# Patient Record
Sex: FEMALE | Race: BLACK OR AFRICAN AMERICAN | NOT HISPANIC OR LATINO | Employment: UNEMPLOYED | URBAN - METROPOLITAN AREA
[De-identification: names, ages, dates, MRNs, and addresses within clinical notes are randomized per-mention and may not be internally consistent; named-entity substitution may affect disease eponyms.]

---

## 2021-09-17 ENCOUNTER — HOSPITAL ENCOUNTER (EMERGENCY)
Facility: OTHER | Age: 51
Discharge: HOME | End: 2021-09-17
Attending: EMERGENCY MEDICINE
Payer: MEDICAID

## 2021-09-17 VITALS
HEART RATE: 88 BPM | DIASTOLIC BLOOD PRESSURE: 86 MMHG | RESPIRATION RATE: 19 BRPM | TEMPERATURE: 98 F | BODY MASS INDEX: 25.82 KG/M2 | SYSTOLIC BLOOD PRESSURE: 120 MMHG | WEIGHT: 160 LBS | OXYGEN SATURATION: 98 %

## 2021-09-17 DIAGNOSIS — Z71.85 VACCINE COUNSELING: Primary | ICD-10-CM

## 2021-09-17 PROCEDURE — 99282 EMERGENCY DEPT VISIT SF MDM: CPT | Mod: 25

## 2021-09-17 PROCEDURE — 0002A HC IMMUNIZ ADMIN, SARS-COV-2 COVID-19 VACC, 30MCG/0.3ML, 2ND DOSE: CPT | Performed by: PHYSICIAN ASSISTANT

## 2021-09-17 PROCEDURE — 63600175 PHARM REV CODE 636 W HCPCS: Performed by: PHYSICIAN ASSISTANT

## 2021-09-17 PROCEDURE — 91300 PHARM REV CODE 636 W HCPCS: CPT | Performed by: PHYSICIAN ASSISTANT

## 2021-09-17 RX ADMIN — RNA INGREDIENT BNT-162B2 0.3 ML: 0.23 INJECTION, SUSPENSION INTRAMUSCULAR at 12:09

## 2023-01-18 ENCOUNTER — HOSPITAL ENCOUNTER (EMERGENCY)
Facility: OTHER | Age: 53
Discharge: LEFT BEFORE DISCHARGE | End: 2023-01-18
Payer: MEDICAID

## 2023-01-18 VITALS
OXYGEN SATURATION: 97 % | BODY MASS INDEX: 26.52 KG/M2 | DIASTOLIC BLOOD PRESSURE: 67 MMHG | HEART RATE: 88 BPM | TEMPERATURE: 99 F | SYSTOLIC BLOOD PRESSURE: 112 MMHG | WEIGHT: 165 LBS | RESPIRATION RATE: 18 BRPM | HEIGHT: 66 IN

## 2023-01-18 DIAGNOSIS — R07.9 CHEST PAIN: ICD-10-CM

## 2023-01-18 PROCEDURE — 93010 ELECTROCARDIOGRAM REPORT: CPT | Mod: ,,, | Performed by: INTERNAL MEDICINE

## 2023-01-18 PROCEDURE — 99900041 HC LEFT WITHOUT BEING SEEN- EMERGENCY

## 2023-01-18 PROCEDURE — 93005 ELECTROCARDIOGRAM TRACING: CPT

## 2023-01-18 PROCEDURE — 93010 EKG 12-LEAD: ICD-10-PCS | Mod: ,,, | Performed by: INTERNAL MEDICINE

## 2023-01-26 ENCOUNTER — HOSPITAL ENCOUNTER (EMERGENCY)
Facility: HOSPITAL | Age: 53
Discharge: HOME | End: 2023-01-26
Attending: EMERGENCY MEDICINE
Payer: COMMERCIAL

## 2023-01-26 VITALS
OXYGEN SATURATION: 97 % | HEART RATE: 88 BPM | RESPIRATION RATE: 18 BRPM | DIASTOLIC BLOOD PRESSURE: 70 MMHG | BODY MASS INDEX: 26.63 KG/M2 | TEMPERATURE: 99 F | HEIGHT: 66 IN | SYSTOLIC BLOOD PRESSURE: 120 MMHG

## 2023-01-26 DIAGNOSIS — S80.12XA CONTUSION OF LEFT LOWER EXTREMITY, INITIAL ENCOUNTER: Primary | ICD-10-CM

## 2023-01-26 PROCEDURE — 99283 EMERGENCY DEPT VISIT LOW MDM: CPT | Mod: ,,, | Performed by: EMERGENCY MEDICINE

## 2023-01-26 PROCEDURE — 99283 PR EMERGENCY DEPT VISIT,LEVEL III: ICD-10-PCS | Mod: ,,, | Performed by: EMERGENCY MEDICINE

## 2023-01-26 PROCEDURE — 99281 EMR DPT VST MAYX REQ PHY/QHP: CPT

## 2023-01-26 RX ORDER — KETOROLAC TROMETHAMINE 10 MG/1
10 TABLET, FILM COATED ORAL
Status: DISCONTINUED | OUTPATIENT
Start: 2023-01-26 | End: 2023-01-26

## 2023-01-26 NOTE — ED TRIAGE NOTES
Pt arriving to ED with c/o left shin pain. Pt struck the anterior left shin on an arm rest while exiting a vehicle. Small hematoma noted. Pt able to ambulate without difficulty.

## 2023-01-26 NOTE — ED NOTES
Bed: [facility]  Expected date: 1/26/23  Expected time:   Means of arrival:   Comments:  Marie

## 2023-01-26 NOTE — FIRST PROVIDER EVALUATION
Emergency Department TeleTriage Encounter Note      CHIEF COMPLAINT    Chief Complaint   Patient presents with    Leg Pain     Pt struck the anterior left shin on an arm rest while exiting a vehicle. Small hematoma noted. Pt able to ambulate without difficulty.        VITAL SIGNS   Initial Vitals [01/26/23 1117]   BP Pulse Resp Temp SpO2   120/70 105 18 98.5 °F (36.9 °C) 97 %      MAP       --            ALLERGIES    Review of patient's allergies indicates:  No Known Allergies    PROVIDER TRIAGE NOTE  Patient presents with swollen area on left shin secondary to hitting it on an arm rest while exiting a bus. No bony pain or joint pain.       ORDERS  Labs Reviewed - No data to display    ED Orders (720h ago, onward)      None              Virtual Visit Note: The provider triage portion of this emergency department evaluation and documentation was performed via [employer], a HIPAA-compliant telemedicine application, in concert with a tele-presenter in the room. A face to face patient evaluation with one of my colleagues will occur once the patient is placed in an emergency department room.      DISCLAIMER: This note was prepared with M*VTM voice recognition transcription software. Garbled syntax, mangled pronouns, and other bizarre constructions may be attributed to that software system.

## 2023-01-26 NOTE — ED NOTES
Patient identifiers verified and correct for Amina Salazar   LOC: The patient is awake, alert and aware of environment with an appropriate affect, the patient is oriented x 3 and speaking appropriately.   APPEARANCE: Patient appears comfortable and in no acute distress, patient is clean and well groomed.  SKIN: The skin is warm and dry, color consistent with ethnicity, patient has normal skin turgor and moist mucus membranes.   MUSCULOSKELETAL: Patient moving all extremities spontaneously, small; bruise and knot noted to left lower leg.   RESPIRATORY: Airway is open and patent, respirations are spontaneous, patient has a normal effort and rate, no accessory muscle use noted, O2 sats noted at 97% on room air.  CARDIAC: Patient has a tachy rate, no edema noted, capillary refill < 3 seconds.   GASTRO: Soft and non tender to palpation, no distention noted, normoactive bowel sounds present in all four quadrants. Pt states bowel movements have been regular.  : Pt denies any pain or frequency with urination.  NEURO: Pt opens eyes spontaneously, behavior appropriate to situation, follows commands, facial expression symmetrical, bilateral hand grasp equal and even, purposeful motor response noted, normal sensation in all extremities when touched with a finger.

## 2023-01-26 NOTE — ED PROVIDER NOTES
Encounter Date: 2023       History     Chief Complaint   Patient presents with    Leg Pain     Pt struck the anterior left shin on an arm rest while exiting a vehicle. Small hematoma noted. Pt able to ambulate without difficulty.      HPI  53 Y/O C/O     Review of patient's allergies indicates:  No Known Allergies  History reviewed. No pertinent past medical history.  Past Surgical History:   Procedure Laterality Date     SECTION       History reviewed. No pertinent family history.  Social History     Tobacco Use    Smoking status: Every Day     Packs/day: 1.00     Types: Cigarettes    Smokeless tobacco: Never   Substance Use Topics    Alcohol use: No    Drug use: No     Review of Systems    Physical Exam     Initial Vitals [23 1117]   BP Pulse Resp Temp SpO2   120/70 105 18 98.5 °F (36.9 °C) 97 %      MAP       --         Physical Exam    ED Course   Procedures  Labs Reviewed   POCT URINE PREGNANCY          Imaging Results    None          Medications   ketorolac tablet 10 mg (has no administration in time range)     Medical Decision Making:   History:   Old Medical Records: I decided to obtain old medical records.  Initial Assessment:   Well appearing afebrile, hemodynamically stable neurovascularly intact female presents with signs and symptoms of contusion to left tibialis anterior muscle of left lower extremity.  No skin tears warranting repair.  No need for x-rays, as patient has bony tenderness to palpation no, deformity and patient has been ambulating for 3 days since to vision to site.  She reports being here in ED for accidental reporting and treatment form being filled out, which I will gladly comply.  Recommended rest and over-the-counter acetaminophen or ibuprofen as needed for pain.                        Clinical Impression:   Final diagnoses:  [S80.12XA] Contusion of left lower extremity, initial encounter - 3 days ago (Primary)        ED Disposition Condition    Discharge Stable           ED Prescriptions    None       Follow-up Information       Follow up With Specialties Details Why Contact Info    [facility] - Emergency Dept Emergency Medicine  If pain worsens, inability to ambulate/walk, any numbness, weakness or any new concerns [id number]  [facility] [id number]  [phone]             Naren Villatoro MD  01/27/23 9404

## 2023-03-10 ENCOUNTER — TELEPHONE (OUTPATIENT)
Dept: SURGERY | Facility: CLINIC | Age: 53
End: 2023-03-10
Payer: MEDICAID

## 2023-03-10 DIAGNOSIS — D05.10 DCIS (DUCTAL CARCINOMA IN SITU) OF BREAST: Primary | ICD-10-CM

## 2023-03-10 NOTE — TELEPHONE ENCOUNTER
Attempted to call pt to schedule, no answer, left voicemail for return call. Will hold off on sending for interp until pt makes appt.

## 2023-03-10 NOTE — TELEPHONE ENCOUNTER
----- Message from Michael Beard sent at 3/8/2023 11:14 AM CST -----  Leeanna Latricia,    Just checked my request and all images I requested have been put in the chart. Thank you for checking!  ----- Message -----  From: Karla Anne RN  Sent: 3/8/2023   9:26 AM CST  To: Michael Beard    Good morning Michael,  I took this one over for Corinne. I see some images, do you know if this is all of the ones you requested? Want to make sure before I schedule, thanks  Latricia  ----- Message -----  From: Corinne E Coniglio, RN  Sent: 3/7/2023  11:31 AM CST  To: Karla Anne RN      ----- Message -----  From: Michael Beard  Sent: 3/3/2023   1:03 PM CST  To: Corinne E Coniglio, RN, Thea Perrin Hey Corinne,    The [employer] insurance is connected to a Workers Comp guarantor account. The Medicaid is verified active as of today.  ----- Message -----  From: Corinne E Coniglio, RN  Sent: 3/3/2023  12:49 PM CST  To: Michael Sanabriaa,   Not sure if her medicaid is still active but do we take [employer] insurance?  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  Diamond Dominguez  02/22/23 hematology oncology [facility] care everywhere    What imaging have you had? mammogram screening bilateral 01/24/23 and mammo tomosynthesis diagnostic left 013/0/23  Where did that occur?  [facility] (last 3 years in care everywhere)    Have you been diagnosed with cancer by a biopsy and/or surgery? Yes   Date of biopsy: 02/06/23  Date of surgery:  Where did that occur?  [facility] (2/06/23 radiology encounter care everywhere)    Other pertinent info:  The patient would like a second opinion with us and would like to be scheduled before 03/09/23 (she  will see [facility] that day)  Sent request to [facility] to 6APT

## 2023-03-13 ENCOUNTER — HOSPITAL ENCOUNTER (OUTPATIENT)
Dept: RADIOLOGY | Facility: HOSPITAL | Age: 53
Discharge: HOME | End: 2023-03-13
Attending: SURGERY
Payer: MEDICAID

## 2023-03-13 PROCEDURE — 77066 DX MAMMO INCL CAD BI: CPT | Mod: 26,,, | Performed by: RADIOLOGY

## 2023-03-13 PROCEDURE — 19081 BX BREAST 1ST LESION STRTCTC: CPT | Mod: 26,LT,, | Performed by: RADIOLOGY

## 2023-03-13 PROCEDURE — 77066 PR MAMMO, CAD, DIAGNOSTIC, BILAT: ICD-10-PCS | Mod: 26,,, | Performed by: RADIOLOGY

## 2023-03-13 PROCEDURE — 19081 PR BX BRST, 1ST LESION, STEREOTACTIC GUIDANCE: ICD-10-PCS | Mod: 26,LT,, | Performed by: RADIOLOGY

## 2023-03-20 ENCOUNTER — LAB VISIT (OUTPATIENT)
Dept: LAB | Facility: HOSPITAL | Age: 53
End: 2023-03-20
Attending: SURGERY
Payer: MEDICAID

## 2023-03-20 ENCOUNTER — DOCUMENTATION ONLY (OUTPATIENT)
Dept: SURGERY | Facility: CLINIC | Age: 53
End: 2023-03-20
Payer: MEDICAID

## 2023-03-20 ENCOUNTER — OFFICE VISIT (OUTPATIENT)
Dept: SURGERY | Facility: CLINIC | Age: 53
End: 2023-03-20
Payer: MEDICAID

## 2023-03-20 VITALS
WEIGHT: 152 LBS | BODY MASS INDEX: 24.43 KG/M2 | HEIGHT: 66 IN | SYSTOLIC BLOOD PRESSURE: 128 MMHG | TEMPERATURE: 99 F | OXYGEN SATURATION: 99 % | HEART RATE: 110 BPM | DIASTOLIC BLOOD PRESSURE: 74 MMHG

## 2023-03-20 DIAGNOSIS — C50.312 MALIGNANT NEOPLASM OF LOWER-INNER QUADRANT OF LEFT FEMALE BREAST, UNSPECIFIED ESTROGEN RECEPTOR STATUS: Primary | ICD-10-CM

## 2023-03-20 DIAGNOSIS — C50.312 MALIGNANT NEOPLASM OF LOWER-INNER QUADRANT OF LEFT FEMALE BREAST, UNSPECIFIED ESTROGEN RECEPTOR STATUS: ICD-10-CM

## 2023-03-20 DIAGNOSIS — Z01.818 PRE-OP TESTING: ICD-10-CM

## 2023-03-20 DIAGNOSIS — C50.312 MALIGNANT NEOPLASM OF LOWER-INNER QUADRANT OF LEFT FEMALE BREAST: Primary | ICD-10-CM

## 2023-03-20 DIAGNOSIS — D05.12 DUCTAL CARCINOMA IN SITU (DCIS) OF LEFT BREAST: Primary | ICD-10-CM

## 2023-03-20 LAB
ALBUMIN SERPL BCP-MCNC: 3.9 G/DL (ref 3.5–5.2)
ALP SERPL-CCNC: 72 U/L (ref 55–135)
ALT SERPL W/O P-5'-P-CCNC: 18 U/L (ref 10–44)
ANION GAP SERPL CALC-SCNC: 5 MMOL/L (ref 8–16)
AST SERPL-CCNC: 20 U/L (ref 10–40)
BASOPHILS # BLD AUTO: 0.06 K/UL (ref 0–0.2)
BASOPHILS NFR BLD: 0.7 % (ref 0–1.9)
BILIRUB SERPL-MCNC: 0.3 MG/DL (ref 0.1–1)
BUN SERPL-MCNC: 10 MG/DL (ref 6–20)
CALCIUM SERPL-MCNC: 10 MG/DL (ref 8.7–10.5)
CHLORIDE SERPL-SCNC: 107 MMOL/L (ref 95–110)
CO2 SERPL-SCNC: 29 MMOL/L (ref 23–29)
CREAT SERPL-MCNC: 0.9 MG/DL (ref 0.5–1.4)
DIFFERENTIAL METHOD: ABNORMAL
EOSINOPHIL # BLD AUTO: 0.1 K/UL (ref 0–0.5)
EOSINOPHIL NFR BLD: 0.6 % (ref 0–8)
ERYTHROCYTE [DISTWIDTH] IN BLOOD BY AUTOMATED COUNT: 13.2 % (ref 11.5–14.5)
EST. GFR  (NO RACE VARIABLE): >60 ML/MIN/1.73 M^2
GLUCOSE SERPL-MCNC: 72 MG/DL (ref 70–110)
HCT VFR BLD AUTO: 43.9 % (ref 37–48.5)
HGB BLD-MCNC: 14.3 G/DL (ref 12–16)
IMM GRANULOCYTES # BLD AUTO: 0.02 K/UL (ref 0–0.04)
IMM GRANULOCYTES NFR BLD AUTO: 0.2 % (ref 0–0.5)
LYMPHOCYTES # BLD AUTO: 2.2 K/UL (ref 1–4.8)
LYMPHOCYTES NFR BLD: 27.1 % (ref 18–48)
MCH RBC QN AUTO: 32.1 PG (ref 27–31)
MCHC RBC AUTO-ENTMCNC: 32.6 G/DL (ref 32–36)
MCV RBC AUTO: 98 FL (ref 82–98)
MONOCYTES # BLD AUTO: 0.6 K/UL (ref 0.3–1)
MONOCYTES NFR BLD: 6.8 % (ref 4–15)
NEUTROPHILS # BLD AUTO: 5.3 K/UL (ref 1.8–7.7)
NEUTROPHILS NFR BLD: 64.6 % (ref 38–73)
NRBC BLD-RTO: 0 /100 WBC
PLATELET # BLD AUTO: 263 K/UL (ref 150–450)
PMV BLD AUTO: 11.9 FL (ref 9.2–12.9)
POTASSIUM SERPL-SCNC: 3.8 MMOL/L (ref 3.5–5.1)
PROT SERPL-MCNC: 7.7 G/DL (ref 6–8.4)
RBC # BLD AUTO: 4.46 M/UL (ref 4–5.4)
SODIUM SERPL-SCNC: 141 MMOL/L (ref 136–145)
WBC # BLD AUTO: 8.22 K/UL (ref 3.9–12.7)

## 2023-03-20 PROCEDURE — 99205 OFFICE O/P NEW HI 60 MIN: CPT | Mod: S$PBB,,, | Performed by: SURGERY

## 2023-03-20 PROCEDURE — 1159F MED LIST DOCD IN RCRD: CPT | Mod: CPTII,,, | Performed by: SURGERY

## 2023-03-20 PROCEDURE — 99205 PR OFFICE/OUTPT VISIT, NEW, LEVL V, 60-74 MIN: ICD-10-PCS | Mod: S$PBB,,, | Performed by: SURGERY

## 2023-03-20 PROCEDURE — 85025 COMPLETE CBC W/AUTO DIFF WBC: CPT | Performed by: SURGERY

## 2023-03-20 PROCEDURE — 99999 PR PBB SHADOW E&M-EST. PATIENT-LVL III: ICD-10-PCS | Mod: PBBFAC,,, | Performed by: SURGERY

## 2023-03-20 PROCEDURE — 36415 COLL VENOUS BLD VENIPUNCTURE: CPT | Performed by: SURGERY

## 2023-03-20 PROCEDURE — 3078F PR MOST RECENT DIASTOLIC BLOOD PRESSURE < 80 MM HG: ICD-10-PCS | Mod: CPTII,,, | Performed by: SURGERY

## 2023-03-20 PROCEDURE — 80053 COMPREHEN METABOLIC PANEL: CPT | Performed by: SURGERY

## 2023-03-20 PROCEDURE — 3008F PR BODY MASS INDEX (BMI) DOCUMENTED: ICD-10-PCS | Mod: CPTII,,, | Performed by: SURGERY

## 2023-03-20 PROCEDURE — 3074F PR MOST RECENT SYSTOLIC BLOOD PRESSURE < 130 MM HG: ICD-10-PCS | Mod: CPTII,,, | Performed by: SURGERY

## 2023-03-20 PROCEDURE — 99999 PR PBB SHADOW E&M-EST. PATIENT-LVL III: CPT | Mod: PBBFAC,,, | Performed by: SURGERY

## 2023-03-20 PROCEDURE — 1160F RVW MEDS BY RX/DR IN RCRD: CPT | Mod: CPTII,,, | Performed by: SURGERY

## 2023-03-20 PROCEDURE — 1160F PR REVIEW ALL MEDS BY PRESCRIBER/CLIN PHARMACIST DOCUMENTED: ICD-10-PCS | Mod: CPTII,,, | Performed by: SURGERY

## 2023-03-20 PROCEDURE — 3078F DIAST BP <80 MM HG: CPT | Mod: CPTII,,, | Performed by: SURGERY

## 2023-03-20 PROCEDURE — 99213 OFFICE O/P EST LOW 20 MIN: CPT | Mod: PBBFAC | Performed by: SURGERY

## 2023-03-20 PROCEDURE — 3008F BODY MASS INDEX DOCD: CPT | Mod: CPTII,,, | Performed by: SURGERY

## 2023-03-20 PROCEDURE — 3074F SYST BP LT 130 MM HG: CPT | Mod: CPTII,,, | Performed by: SURGERY

## 2023-03-20 PROCEDURE — 1159F PR MEDICATION LIST DOCUMENTED IN MEDICAL RECORD: ICD-10-PCS | Mod: CPTII,,, | Performed by: SURGERY

## 2023-03-20 NOTE — PROGRESS NOTES
Genetics Lay Navigator Note:    Nurse Navigator : TETO Olivera RN    Met with patient at her consult with Dr. Holder (3/20/2023), to facilitate genetic testing. Set patient up with [facility] genetic counselor over the phone to complete counseling prior to testing. Patient verbalized understanding to all counseling information. [facility] brochure with number to call with questions or concerns provided to patient as well as my card. Encouraged patient to call me or [facility] at any time.     Lab appointment made and patient escorted with [facility] kit to lab for specimen draw and processing. Patient instructed that results will be provided as soon as they are available. No questions or concerns from patient about plan of care.     [facility] Genetic Pedigree scanned in media and attached to this documentation note.    [employer] Tracking # [id number]

## 2023-03-20 NOTE — NURSING
Nurse Navigator Note:     Met with patient during her consult with Dr. Holder.  Patient and I reviewed the information she discussed with Dr. Holder, including treatment options, diagnosis, and future plans for workup. Patient and I went through the new patient booklet, explained some of the information and why it is provided.     Also offered patient consults with our other specialty clinics: Integrative Oncology, Survivorship and/or Women's Gynecologic needs, our breast physical therapy department for pre-op and post-operative assessments, Oncologic Psychology for psychological support, and Oncologic Nutrition for nutritional counseling. Explained to patient that all of these support services are completely optional. Discussed that physical therapy may call patient to offer pre-op appt, and what that appt would entail.     Patient was given a copy of her appointments, Dr. Holder's card, and my card. Encouraged her to call me if she has any questions or concerns or would like to schedule any additional appointments. Verbalized understanding of all information.    Genetics done at visit. PT and integrative referrals placed. Patient declined referral to smoking cessation. E mail added to support group list.  Oncology Navigation   Intake  Date of Diagnosis: 02/06/23  Cancer Type: Breast  Internal / External Referral: External  Initial Nurse Navigator Contact: 03/20/23  Date Worked: 03/20/23     Treatment  Current Status: Staging work-up    Surgical Oncologist: Dr. Holder  Consult Date: 03/20/23          Procedures: Biopsy; MRI  Biopsy Schedule Date: 02/06/23  MRI Schedule Date: 03/29/23             Support Systems: Children     Acuity      Follow Up  Follow up in about 11 days (around 3/31/2023) for f/u after MRI.

## 2023-03-21 RX ORDER — LORAZEPAM 1 MG/1
1 TABLET ORAL SEE ADMIN INSTRUCTIONS
Qty: 1 TABLET | Refills: 0 | Status: SHIPPED | OUTPATIENT
Start: 2023-03-21 | End: 2023-10-02

## 2023-03-22 NOTE — PROGRESS NOTES
Breast Surgery  [facility]  Department of Surgery      REFERRING PROVIDER: Diamond Dominguez MD  [address] [place] [id number]    Chief Complaint: Consult (New Patient 2nd Opinion Left Breast Ductal Carcinoma .)      Subjective:      Patient ID: Amina Salazar is a 52 y.o. female who presents with left breast Ductal Carcinoma in Situ.     She presented for screening mammogram on 01/24/2023 for yearly scheduled screening. This identified calcifications in the left breast. Follow-up mammogram on 01/30/2023 showed an area of amorphous calcifications in the lower inner quadrant. A stereotactic biopsy was performed on 02/06/2023 with pathology revealing ductal carcinoma in-situ of the breast.     The patient's initial imaging was performed at outside institution.  Our radiologist reviewed her imaging and found the area of calcifications to be slightly larger.  We measured approximately 5 x 4.9 cm of calcifications in the lower inner quadrant.    Pathology from an outside institution showed high-grade DCIS with mask with concern for possible invasion.  Hormone receptor testing was not able to be performed.    She was seen for evaluation for surgery and reconstruction at the outside I facility.  She was recommended quit smoking but has not been able to.  She reports that she does not believe that she has breast cancer and is inclined to proceed with no interventions.    Findings at that time were the following:   Lesion 1:    Location:  Left, lower inner quadrant   Clip:  No post biopsy mammogram available for review  Tumor size:  5 cm   Tumor ndgndrndanddndend:nd nd2nd Estrogen Receptor:  Unknown   Progesterone Receptor:  Unknown     Patient has not noted a change on breast exam.  Patient denies nipple discharge. Patient denies previous breast biopsy. Patient denies personal history of breast cancer. Family history includes mother with breast cancer at 50, cousin with breast cancer at 50, maternal uncle bone cancer at 55..  "    GYN History:  Age of menarche was 13.  Possibly perimenopausal only recently stopped birth control shots.  Patient denies hormonal therapy. Patient is . Age of first live birth was 15. Patient did not breast feed.    History reviewed. No pertinent past medical history.  Past Surgical History:   Procedure Laterality Date     SECTION       Current Outpatient Medications on File Prior to Visit   Medication Sig Dispense Refill    multivitamin with minerals tablet Take 1 tablet by mouth once daily.       No current facility-administered medications on file prior to visit.     Social History     Socioeconomic History    Marital status: Single   Tobacco Use    Smoking status: Every Day     Packs/day: 1.00     Types: Cigarettes    Smokeless tobacco: Never   Substance and Sexual Activity    Alcohol use: No    Drug use: No     History reviewed. No pertinent family history.     Review of Systems   All other systems reviewed and are negative.  Objective:   /74 (BP Location: Right arm, Patient Position: Sitting, BP Method: Medium (Automatic))   Pulse 110   Temp 98.6 °F (37 °C) (Oral)   Ht 5' 6" (1.676 m)   Wt 68.9 kg (152 lb)   SpO2 99%   BMI 24.53 kg/m²     Physical Exam   Vitals reviewed.  Constitutional: She is oriented to person, place, and time.   Cardiovascular:  Normal rate.      Exam reveals no gallop.       No murmur heard.  Pulmonary/Chest: Effort normal. No respiratory distress. She has no wheezes. Right breast exhibits no inverted nipple, no mass, no nipple discharge, no skin change and no tenderness. Left breast exhibits no inverted nipple, no mass, no nipple discharge, no skin change and no tenderness.   Abdominal: Normal appearance.   Musculoskeletal: Lymphadenopathy:      Cervical: No cervical adenopathy.      Upper Body:      Right upper body: No supraclavicular or axillary adenopathy.      Left upper body: No supraclavicular or axillary adenopathy.     Neurological: She is alert and " oriented to person, place, and time.   Skin: Skin is warm and dry.     Psychiatric: Her behavior is normal. Mood, judgment and thought content normal.     Radiology review: Images personally reviewed by me in the clinic and shown to the patient during the consultation.     Assessment:       1. Ductal carcinoma in situ (DCIS) of left breast    2. Pre-op testing          Plan:   Multidisciplinary nature of breast cancer care was discussed in detail at today's visit.    The patient reports she does not believe her cancer diagnosis and is inclined to pursue no interventions.  We discussed the possible benefit of the 2nd site biopsy as there are extensive calcifications and this would help us confirm the extent of disease.  We could also obtain more information about her hormone receptor status.  It may help her believe in the cancer diagnosis further as well.  Since there are multiple benefits risks 2nd site biopsy I recommend she proceed with it.  However I am also recommend a breast MRI to further evaluate the extent of disease.  We will get the MRI 1st and then determine the best site of the 2nd site biopsy.  Once the MRI and biopsy results are available we will discuss this further.  I am hopeful at that time the patient will feel confident in her breast cancer diagnosis.    We discussed that surgical options would include a lumpectomy versus a mastectomy.  We discussed there is no survival benefit to undergoing a mastectomy compared to lumpectomy.  We discussed that our review of the area of calcifications shows a 5 x 5 cm regional distribution.  We discussed the 2nd site biopsy to confirm the extent of calcifications.  If this represents a true extent of calcifications she would need a mastectomy as this is a large portion of her breast.  If a smaller area is identified she could potentially be a lumpectomy candidate.  We discussed both of these surgery options in detail including the risks and benefits.  We  discussed the options for breast reconstruction the setting of mastectomy.  We discussed that plastic surgeons usually do not offer reconstruction if the patient is a current nicotine user.  The patient is currently smoking and has failed attempts to quit and is not interested in quitting at this time.    We discussed sentinel lymph node biopsy in full detail including the risks and benefits.  We discussed that if a lumpectomy is done for DCIS then sentinel lymph node biopsy is not always done however when there was a large extent of breast removed then it is performed.  Due to the large extent of her calcifications she will likely need a sentinel lymph node biopsy even if a large lumpectomy is done.    We discussed the role of adjuvant radiation therapy in breast conservation.  We discussed this is not typically done with mastectomy unless breast cancer is found to be a large invasive cancer there is positive lymph nodes.      We also discussed the role of systemic therapy in the treatment of DCIS. Chemotherapy is not utilizing the treatment of DCIS.  We discussed that in the setting of hormone receptor positive DCIS, endocrine therapy would be recommended to decrease her risk of recurrence.  Side effects of treatment were briefly discussed.  We discussed that her hormone receptor status is unknown at this time.  We are hopeful that we will obtain this information from the biopsy.  She was interested in hearing about options for treatment that did not include surgery.  We discussed that some patients with hormone receptor positive breast cancers take endocrine therapy without other treatments however this has not occurred of approach.  This approach is usually limited the patient's with limited lifespan due to advanced age or severe comorbidities.  I would not recommend this approach for most patients including Ms. Salazar.     She does meet NCCN guidelines for genetic testing based on her family history. We  discussed genetic testing at length and she will like to proceed.    Following her discussion today, she does not currently believe that she has breast cancer but is unwilling to undergo additional workup.  I am recommending a breast MRI to fully evaluate the extent of disease and to identify the best target for a 2nd site biopsy.  A 2nd site biopsy will help us determine the full extent of the calcifications and will gain additional information about the DCIS including endocrine status.  We discussed extensively that all treatments for breast cancer that are considered curative include some type of surgery.  She expressed understanding.    Surgery will be scheduled. Follow-up in clinic roughly 14 days after surgery.      Patient was given patient information binder including [facility] breast cancer treatment brochure.  All her questions were answered.    Total time spent with the patient: 60 minutes.  55 minutes of face to face consultation and 5 minutes of chart review and coordination of care.

## 2023-03-29 ENCOUNTER — HOSPITAL ENCOUNTER (OUTPATIENT)
Dept: RADIOLOGY | Facility: OTHER | Age: 53
Discharge: HOME | End: 2023-03-29
Attending: SURGERY
Payer: MEDICAID

## 2023-03-29 DIAGNOSIS — C50.312 MALIGNANT NEOPLASM OF LOWER-INNER QUADRANT OF LEFT FEMALE BREAST: ICD-10-CM

## 2023-03-29 PROCEDURE — 77049 MRI BREAST C-+ W/CAD BI: CPT | Mod: 26,,, | Performed by: RADIOLOGY

## 2023-03-29 PROCEDURE — 77049 MRI BREAST C-+ W/CAD BI: CPT | Mod: TC

## 2023-03-29 PROCEDURE — A9577 INJ MULTIHANCE: HCPCS | Performed by: SURGERY

## 2023-03-29 PROCEDURE — 77049 MRI BREAST W/WO CONTRAST, W/CAD, BILATERAL: ICD-10-PCS | Mod: 26,,, | Performed by: RADIOLOGY

## 2023-03-29 PROCEDURE — 25500020 PHARM REV CODE 255: Performed by: SURGERY

## 2023-03-29 RX ADMIN — GADOBENATE DIMEGLUMINE 12 ML: 529 INJECTION, SOLUTION INTRAVENOUS at 06:03

## 2023-03-30 ENCOUNTER — CLINICAL SUPPORT (OUTPATIENT)
Dept: REHABILITATION | Facility: HOSPITAL | Age: 53
End: 2023-03-30
Attending: SURGERY
Payer: MEDICAID

## 2023-03-30 DIAGNOSIS — M25.612 DECREASED RANGE OF MOTION OF LEFT SHOULDER: ICD-10-CM

## 2023-03-30 DIAGNOSIS — C50.312 MALIGNANT NEOPLASM OF LOWER-INNER QUADRANT OF LEFT FEMALE BREAST, UNSPECIFIED ESTROGEN RECEPTOR STATUS: ICD-10-CM

## 2023-03-30 PROCEDURE — 97162 PT EVAL MOD COMPLEX 30 MIN: CPT

## 2023-03-30 NOTE — PLAN OF CARE
OUTPATIENT PHYSICAL THERAPY   PRE-OP EVALUATION    Date: 3/30/2023   Name: Amina Salazar  Clinic Number: [id number]    Therapy Diagnosis:   Encounter Diagnoses   Name Primary?    Malignant neoplasm of lower-inner quadrant of left female breast, unspecified estrogen receptor status     Decreased range of motion of left shoulder      Physician: JIN Holder MD    Physician Orders: PT Eval and Treat   Medical Diagnosis: C50.312 (ICD-10-CM) - Malignant neoplasm of lower-inner quadrant of left female breast, unspecified estrogen receptor status  Evaluation Date: 3/30/2023  Authorization period Expiration: 3/19/2024  Plan of Care Certification Period: 3/30/2023  Insurance: MEDICAID/[employer] [facility]     Visit #: 1/ Visits authorized: 1  Time In:10:00 am  Time Out: 11:00 am  Total Billable Time: 60 minutes    Precautions: Standard and cancer    History   History of Present Illness: Amina is a 52 y.o. female that presents to  [facility] at the [facility] secondary to dx of left breast cancer.    Dx: left breast Ductal Carcinoma in Situ.   Surgery date: TBD      Pt presents today for baseline measurements to aid in the early detection of lymphedema, UE muscle testing, postural and ROM assessment along with education of risk of lymphedema and surgical precautions post surgery. Circumferential measurements will be taken today of BL UEs for early detection of lymphedema post surgery. Pt will also be instructed in exercises to perform pre and post-surgery to insure best outcomes.     Past Medical History:   No past medical history on file.    Past Surgical History:   Amina Salazar  has a past surgical history that includes  section.    Medications:  Amina has a current medication list which includes the following prescription(s): lorazepam and multivitamin with minerals.    Allergies:  Review of patient's allergies indicates:  No Known Allergies       Hand  PT DAILY TREATMENT NOTE 10-18    Patient Name: Radha Pisano  Date:2020  : 1976  [x]  Patient  Verified  Payor:  / Plan: Eduin Aguirre 73 / Product Type:  /    In time:1115  Out time:1150  Total Treatment Time (min): 35  Visit #: 3 of 9    Treatment Area: Pain in left shoulder [M25.512]  Cervicalgia [M54.2]  Low back pain [M54.5]  Numbness and tingling of right arm [R20.0, R20.2]    SUBJECTIVE  Pain Level (0-10 scale): 3  Any medication changes, allergies to medications, adverse drug reactions, diagnosis change, or new procedure performed?: [x] No    [] Yes (see summary sheet for update)  Subjective functional status/changes:   [] No changes reported  \"I'm sore but better than I was. \"    OBJECTIVE    23Modality rationale: PD   Min Type Additional Details      []? Estim:  []? Unatt       []? IFC  []? Premod                        []?Other:  []?w/ice   []?w/heat  Position:  Location:      []? Estim: []? Att    []? TENS instruct  []? NMES                    []?Other:  []?w/US   []?w/ice   []?w/heat  Position:  Location:      []? Traction: []? Cervical       []? Lumbar                       []? Prone          []? Supine                       []?Intermittent   []? Continuous Lbs:  []? before manual  []? after manual      []? Ultrasound: []? Continuous   []? Pulsed                           []? 1MHz   []? 3MHz W/cm2:  Location:      []? Iontophoresis with dexamethasone         Location: []? Take home patch   []? In clinic      []? Ice     []?  heat  []? Ice massage  []? Laser   []? Anodyne Position:  Location:      []? Laser with stim  []? Other:  Position:  Location:      []? Vasopneumatic Device Pressure:       []? lo []? med []? hi   Temperature: []? lo []? med []? hi    []? Skin assessment post-treatment:  []?intact []? redness- no adverse reaction    []? redness  adverse reaction:         10 min Therapeutic Exercise:  [x]? ? See flow sheet :   Rationale: increase ROM and increase "dominance: right  Prior Therapy: L shoulder  Nutrition:  Normal  Social History: lives alone  Place of Residence (Steps/Adaptations): 1st floor apartment, 8 steps to enter, handrails on both sides  Current functional status:  independent  Exercise routine prior to onset : doing arm exercises  DME owned: none  Work:  security work                         Subjective   Pt states: left shoulder, "I may have overworked it"  Pain: 3/10 on VAS.     Objective   Mental status: alert & oriented x 3    Posture/Alignment   Postural examination/scapula alignment: rounded shoulders  Joint integrity: WFLs  Skin integrity: intact  Edema: none noted    Sensation: Light Touch: Intact           Proprioception: Intact  - appearance: well groomed     ROM:   UPPER EXTREMITY--AROM/PROM  (R) UE: WNLs  (L) UE: WNLs     Shoulder Range of Motion:   ACTIVE ROM LEFT RIGHT   Flexion 160 170   Abduction 180 180   Extension 70 72   IR/90deg 60 80   ER/90deg 65 95     Strength: manual muscle test grades below   Upper Extremity Strength   (L) UE (R) UE   Shoulder flexion: 5/5 5/5   Shoulder Abduction: 5/5 5/5   Shoulder IR 5/5 5/5   Shoulder ER 4+/5 4+/5   Elbow flexion: 5/5 5/5   Elbow extension: 5/5 5/5   Wrist flexion: 5/5 5/5   Wrist extension: 5/5 5/5    good good       Baseline Measurements of BL UE's for early detection of Lymphedema:     LANDMARK RIGHT UE LEFT UE DIFFERENCE   E + 8" 32.5 cm 30.5 cm 2 cm   E + 6" 30.5 cm 29.5 cm 1 cm   E + 4" 28 cm 26 cm 2 cm   E + 2" 26 cm 23.5 cm 2.5 cm   Elbow 24 cm 23.5 cm 0.5 cm   W+ 8" 24 cm 23 cm 1 cm   W +  6" 24 cm 21.5 cm 2.5 cm   W + 4" 21 cm 18 cm 3 cm   Wrist 15.7 cm 15 cm 0.7 cm   DPC 20 cm 19.5 cm 0.5 cm   IP Thumb 6.3 cm 6 cm 0.3 cm       Endurance Assessment:   Evaluation   30 second Chair Rise  (adults > 61 y/o) 11 completed with no arms       Coordination:   - fine motor: WFL  - UE coordination: intact     - LE coordination:  Not tested     Functional Mobility (Bed mobility, " strength to improve the patients ability to perform ADLs     15 min Neuromuscular Re-education:  [x]? ?  See flow sheet :    Rationale: increase strength, improve coordination and increase proprioception  to improve the patients ability to tolerate sustained postures    10 min Therapeutic Activity:  [x]  See flow sheet :squatting, reaching activities. Rationale: increase ROM, increase strength, improve coordination, improve balance and increase proprioception  to improve the patients ability to perform ADLs with less pain. With   [] TE   [] TA   [] neuro   [] other: Patient Education: [x] Review HEP    [] Progressed/Changed HEP based on:   [] positioning   [] body mechanics   [] transfers   [] heat/ice application    [] other:      Other Objective/Functional Measures:      Pain Level (0-10 scale) post treatment: 2.75    ASSESSMENT/Changes in Function: improved form with squatting and reaching activities today. Plan to progress core and scapular challenge next session as long as pain does not worsen. Patient will continue to benefit from skilled PT services to modify and progress therapeutic interventions, address functional mobility deficits, address ROM deficits, address strength deficits, analyze and address soft tissue restrictions, analyze and cue movement patterns, analyze and modify body mechanics/ergonomics, assess and modify postural abnormalities, address imbalance/dizziness and instruct in home and community integration to attain remaining goals.      []  See Plan of Care  []  See progress note/recertification  []  See Discharge Summary         Progress towards goals / Updated goals:  Short Term Goals: To be accomplished in 2 weeks:  1.  Pt will report compliance with HEP in order to supplement PT treatment              Eval = established              met  2.  Pt will demonstrate left shoulder IR to T10 in order to improve ability to don undergarments              Eval = PSIS      Long Term Goals: To be accomplished in 9 treatments:  1.   Pt will score at least 47 on FOTO in order to improve overall function, decrease pain, and facilitate return to OF.             Eval = 28  2.   Pt will demonstrate bilateral hip extension strength 5-/5 in order to increased ambulation distances in the community              Eval = bilaterally 4/5  3.   Pt will demonstrate left shoulder strength at least 5-/5 in order to improve ability to lift objects overhead at home              Eval = 4/5 into all planes  4.   Pt will demonstrate right  strength at least 43lbs in order to decrease frequency of dropping dishes at home              Eval = 35lbs.      PLAN  []  Upgrade activities as tolerated     [x]  Continue plan of care  []  Update interventions per flow sheet       []  Discharge due to:_  []  Other:_      Jayde Davis, PT 7/20/2020  11:23 AM    Future Appointments   Date Time Provider Trisha Bundy   7/23/2020 11:00 AM Kiran Avendano, PT [facility]   7/27/2020 11:00 AM Kiran Avendano, PT [facility]   7/30/2020 11:00 AM Kiran Avendano, PT [facility]   8/3/2020 11:00 AM Jw Flynn, PT [facility]   8/6/2020 11:00 AM Kiran Avendano, PT [facility]   8/7/2020  2:00 PM Ayse Yañez MD [facility]   8/10/2020  9:30 AM Elier Powell  [address]   8/10/2020 11:00 AM Jw Flynn, PT [facility]   8/11/2020  4:30 PM [facility] YING RM 4 3D [facility] transfers)  Bed mobility: I =  independent   Roll to left: I  Roll to right: I  Supine to prone: I  Scooting to edge of bed: I  Supine to sit: I  Sit to supine: I  Transfers to bed: I  Transfers to toilet: I  Sit to stand:  I  Stand pivot:  I  Car transfers: I      ADL's:  Feeding: I = independent   Grooming: I  Hygiene: I  UB Dressing: I  LB Dressing: I  Toileting: I  Bathing: I    Gait Assessment:   - AD used: none  - Assistance: independent  - Distance: community distances         Pt has no cultural, educational or language barriers to learning provided.    Treatment and Patient Education     Total Time Separate from Evaluation: 25 minutes    Patient participated in self care/home management for 10 minutes including the following:      - Role of PT in multi - disciplinary team, goals for PT  - Pt was educated in lymphedema etiology and management plans.    - Pt was provided with written risk reductions and precautions for managing lymphedema.   - Reviewed SHAY drain care instructions.   - use of a cervical roll to improve cervical alignment when sleeping on her sides    ROM/lifting Precautions post surgery discussed -  until drains have been removed, if needed:  - do not lift affected arm above 90 degrees of shoulder flexion  - do not lift over 5 lbs  - do not pull or push heavy objects  - do not sleep on your stomach or surgery side     Pt was instructed in and performed therapeutic exercise for 15 minutes for postural correction and alignment, stretching and soft tissue mobility.   Exercises included:   - exaggerated deep breathing and relaxation  - scapular retractions  - fist making  - elbow flexion/extension  - supine wand ER at 90 degrees abd  - scapula lift off    Pt was able to demonstrate and report understanding and performance    Written Home Exercises Provided: yes.  Exercises were reviewed and Amina was able to demonstrate them prior to the end of the session.  Amina demonstrated good   understanding of the education provided.     See EMR under Patient Instructions for exercises provided 3/30/2023.      Assessment   This is a 52 y.o. female referred to outpatient physical therapy and presents with a medical diagnosis of left breast cancer and was seen today pre-operatively to assess strength and ROM of BL UEs, to take baseline circumferential measurements of BL UEs to aid in the early detection of lymphedema and provide pt education on exercises/precations post breast surgery. Pt does not exhibit any ROM impairments  Pt educated in lymphedema risks/precautions as well as ROM/lifting precautions post surgery - pt demonstrated/verbalized understanding. No goals established this visit as goals for PT will be established post surgery at follow up.      Anticipated barriers to physical therapy: none anticipated     Pt's spiritual, cultural and educational needs considered and pt agreeable to plan of care and goals as stated below:     Medical necessity is demonstrated by the following IMPAIRMENTS/PROMBLEM LIST:  History  Co-morbidities and personal factors that may impact the plan of care Co-morbidities:   history of cancer and level of undertstanding of current condition    Personal Factors:   no deficits     moderate   Examination  Body Structures and Functions, activity limitations and participation restrictions that may impact the plan of care Body Regions:   N/A    Body Systems:    N/A    Participation Restrictions:   None anticipated    Activity limitations:   Learning and applying knowledge  no deficits    General Tasks and Commands  no deficits    Communication  no deficits    Mobility  no deficits    Self care  no deficits    Domestic Life  no deficits    Interactions/Relationships  no deficits    Life Areas  no deficits    Community and Social Life  no deficits         low   Clinical Presentation evolving clinical presentation with changing clinical characteristics low   Decision Making/  Complexity Score: low           Plan   Schedule patient for follow up with Physical therapy post surgery. Goals for therapy post surgery will be established at that time.     Therapist: Lorrie Wang, PT  3/30/2023

## 2023-03-30 NOTE — PATIENT INSTRUCTIONS
PRE OP PATIENT EDUCATION    Post Operative Instructions     Range of Motion/lifting Precautions post surgery  The following activities should be avoided until your drain(s) have been removed  - do not lift affected arm above 90 degrees of shoulder flexion  - do not lift over 5 lbs  - do not pull or push heavy objects  - do not sleep on your stomach or surgery side       After surgery, you may begin self-care tasks including grooming, dressing, feeding and simple hygiene as soon as you feel up to it.    Schedule your post-op therapy follow-up after your drains have been removed or after your first post-surgery doctor visit.    When to call your doctor   - if any part of your affected arm or axilla feels hot, is reddened or has increased swelling   - if you develop a temperature over 101 degrees Fahrenheit      Lymphedema - Identification and Prevention     Lymphedema - is the swelling of a body area or extremity caused by the accumulation of lymphatic fluid.  There is a risk for lymphedema with the removal of lymph nodes, trauma or radiation therapy.  Treatment of breast cancer often involves surgery: mastectomy or lumpectomy. Some of the lymph nodes in the underarm (called axillary lymph nodes) may be removed and checked to see if they contain cancer cells.     During breast surgery when axillary lymph nodes are removed (with sentinel node biopsy or axillary dissection) or are treated with radiation therapy, the lymphatic system may become impaired. This may prevent lymphatic fluid from leaving the area therefore, causing lymphedema.     Lymphatic fluid is a normal part of the circulatory system. Its function is to remove waste products and to produce cells vital to fighting infection. Swelling occurs when the vessels become restricted and the lymphatic fluid is unable to freely flow through them.  If lymphedema is left untreated, the affected limb could progressively become  more swollen, which could lead to hardening of the skin, bulkiness in the limb, infection and impaired wound healing.         There are things you can do to decrease the chance of developing lymphedema.                                          [email or web address]                                                                      The information presented is intended for general information and educational purposes. It is not intended to replace the  advice of your health care provider. Contact your health care provider if you believe you have a health problem.                                                    POST OP EXERCISES - SAFE TO DO THE FIRST 2 WEEKS AFTER SURGERY UNTIL YOUR FOLLOW UP APPOINTMENT WITH PHYSICAL/OCCUPATIONAL THERAPY    Scapular Retraction (Standing)    With arms at sides, squeeze shoulder blades together. Do not shrug shoulders and do not hold your breath. Hold 5 seconds. Repeat 10 times 1sessions 1-2 x day.       Exaggerated Breathing and Relaxation      Practice deep breathing frequently in the first few days following surgery even before you begin exercising. This exercise helps with tissue extensibility in the chest wall.  Inhale slowly and deeply through the nose and exhale through pursed lips. Concentrate on relaxing as you let the air out of your lungs. Repeat three (3) to four (4) times, remembering to breath in deeply and then relaxing. This exercise helps to ease the sensation of pulling and discomfort that may be experienced while exercising.      Ball Squeeze OR Hand pumps       Perform this exercise three (3) times a day for 10 reps.    The ball squeeze or hand pumps helps to prevent or reduce temporary swelling that may occur in the affected arm. This exercise may be performed standing, sitting or while lying in bed. During this exercise the affected arm should be slightly bent and held upward. Support your arm with a pillow if you are uncomfortable holding it  "up.      AROM: Elbow Flexion / Extension        With left hand palm up, gently bend elbow as far as possible. Then straighten arm as far as possible. Do this in standing.   Repeat 10 times per set. Do 1 sets per session. Do 1-3 sessions per day.      Radiation Position    Place left arm, elbow bent, beside head on pillow. Use 1-3 pillows to be comfortable.  REST AND RELAX.  Do 1-2 times per day.            Shoulder External Rotation 90 Degrees Bilateral     Lay on your back and grab the wand with both hand with palms facing forward. Raise both elbows to shoulder height and adjust hand to maintain a 90 degree elbow bend. Next, without sliding the elbow, reach hand down and back towards table or surface.        Cervical Towel for Sleeping Posture    Place a rolled up towel inside of pillowcase to maintain neck support at night.    Use a larger roll if side sleeping.        Scapular W-> Y Lift at Wall    Stand facing a wall with elbows bent and hands at shoulder height similar to a "W". Then raise arms up sliding them along the wall to form a "Y" as shown. Then lift one hand off of wall, recruiting lower traps and scapular muscles as shown. Keep abdominals engaged and do not back. Return hands to wall and repeat.   Do 10 reps per arm. Do 1 session per day.  "

## 2023-04-03 ENCOUNTER — TELEPHONE (OUTPATIENT)
Dept: SURGERY | Facility: CLINIC | Age: 53
End: 2023-04-03
Payer: MEDICAID

## 2023-04-03 NOTE — TELEPHONE ENCOUNTER
Called and discussed MRI results showing the area of increased blood flow corresponds to area of calcifications.  No other concerning areas in either breast.    Once again discussed the diagnosis of ductal carcinoma in-situ.  We discussed that standard for care includes surgery for removal of the area seen on the mammogram/MRI.  She is not interested in surgical intervention at this time.  She would consider medical interventions.  Unfortunately her prior biopsy was unable to have the estrogen receptor process.  We do not know she will benefit from endocrine therapy.  She also would benefit from a 2nd site of biopsy of the calcifications to further evaluate the extent of disease.  We discussed possible 2nd site biopsy for further receptor testing and classification of the extent of disease.  She is open to this biopsy.  I will have Radiology reach out to her to get the biopsy performed.    She reports trying to utilize holistic approaches and nutrition to treat her breast cancer diagnosis.  We discussed that there are no known curative treatments for breast cancer that do not include surgery.  We did discuss the nature of DCIS and its natural history.  However her prior biopsy did have concern for possible invasive carcinoma and was high-grade.  This type of DCIS has a higher risk of turning to invasive cancer.  She expressed understanding of these risks.  We will re-evaluate her biopsy results from the 2nd site.  If this shows invasive cancer she may be more willing to undergo surgery.    She expressed understanding that surgical interventions are considered the standard of care for grade 3 DCIS with possible invasion.  But is unwilling to proceed with surgery at this time.  She may consider surgery in the future.  She would consider medical interventions such as endocrine therapy.    Plan:  -Second site biopsy to evaluate extent of disease, evaluate for possible invasion, obtain estrogen receptor testing for  possible endocrine therapy  -Referral to nutrition as patient is losing weight utilizing her current restrictive diet which she is attempting to use as breast cancer treatment.  She is open to discussing this further with nutrition.  - referral to Medical Oncology pending 2nd site biopsy results  - presentation of her case at multidisciplinary tumor board   - if she does not proceed with surgical interventions I would at least recommend close interval follow-up with a diagnostic mammogram in June and clinical breast exam.  If any significant changes on the imaging patient may be more willing to proceed with surgery at that time.

## 2023-04-04 ENCOUNTER — TELEPHONE (OUTPATIENT)
Dept: RADIOLOGY | Facility: HOSPITAL | Age: 53
End: 2023-04-04
Payer: MEDICAID

## 2023-04-04 ENCOUNTER — DOCUMENTATION ONLY (OUTPATIENT)
Dept: SURGERY | Facility: CLINIC | Age: 53
End: 2023-04-04
Payer: MEDICAID

## 2023-04-04 DIAGNOSIS — C50.312 MALIGNANT NEOPLASM OF LOWER-INNER QUADRANT OF LEFT FEMALE BREAST, UNSPECIFIED ESTROGEN RECEPTOR STATUS: Primary | ICD-10-CM

## 2023-04-04 NOTE — NURSING
Called patient and scheduled appt with nutrition and medical oncology on 4/11/2023. Reviewed location of appts. Patient verbalized understanding.  Oncology Navigation   Intake  Date of Diagnosis: 02/06/23  Cancer Type: Breast  Internal / External Referral: External  Initial Nurse Navigator Contact: 03/20/23  Date Worked: 04/04/23     Treatment  Current Status: Active    Surgical Oncologist: Dr. Holder  Consult Date: 03/20/23    Medical Oncologist: Dr. Schumacher  Consult Date: 04/11/23       Procedures: Biopsy; MRI  Biopsy Schedule Date: 02/06/23  MRI Schedule Date: 03/29/23    Dietitian Name: SUNG Vaughn Referral Date: 04/04/23          Support Systems: Children     Acuity      Follow Up  Follow up in about 8 days (around 4/12/2023) for f/u after Med onc.

## 2023-04-04 NOTE — TELEPHONE ENCOUNTER
Spoke with patient. Reviewed breast biopsy procedure and reviewed instructions for breast biopsy. Patient expressed understanding and all questions were answered. Provided patient with my phone number to call for any further concerns or questions.   Patient scheduled breast biopsy at the [facility] on 4/20/2023.

## 2023-04-05 ENCOUNTER — TELEPHONE (OUTPATIENT)
Dept: SURGERY | Facility: CLINIC | Age: 53
End: 2023-04-05
Payer: MEDICAID

## 2023-04-05 NOTE — TELEPHONE ENCOUNTER
Genetic Lay Navigation Note:    Called patient with the results of her genetic testing. Informed patient that results were negative for any notable mutation. Instructed patient that we would ensure she received a copy of her results via [employer] or mail, and to call us with any questions or concerns regarding the full report. Patient verbalized understanding to all information, no questions at this time.     Patient's ordering physician made aware of results and that patient was informed of them.

## 2023-04-06 LAB — INTEGRATED BRAC ANALYSIS: NORMAL

## 2023-04-11 ENCOUNTER — OFFICE VISIT (OUTPATIENT)
Dept: HEMATOLOGY/ONCOLOGY | Facility: CLINIC | Age: 53
End: 2023-04-11
Payer: MEDICAID

## 2023-04-11 ENCOUNTER — TUMOR BOARD CONFERENCE (OUTPATIENT)
Dept: SURGERY | Facility: CLINIC | Age: 53
End: 2023-04-11
Payer: MEDICAID

## 2023-04-11 VITALS
HEIGHT: 66 IN | OXYGEN SATURATION: 98 % | WEIGHT: 151.38 LBS | DIASTOLIC BLOOD PRESSURE: 73 MMHG | TEMPERATURE: 98 F | SYSTOLIC BLOOD PRESSURE: 121 MMHG | BODY MASS INDEX: 24.33 KG/M2 | HEART RATE: 78 BPM | RESPIRATION RATE: 18 BRPM

## 2023-04-11 DIAGNOSIS — D05.12 DUCTAL CARCINOMA IN SITU (DCIS) OF LEFT BREAST: ICD-10-CM

## 2023-04-11 DIAGNOSIS — C50.312 MALIGNANT NEOPLASM OF LOWER-INNER QUADRANT OF LEFT FEMALE BREAST, UNSPECIFIED ESTROGEN RECEPTOR STATUS: Primary | ICD-10-CM

## 2023-04-11 PROCEDURE — 99205 PR OFFICE/OUTPT VISIT, NEW, LEVL V, 60-74 MIN: ICD-10-PCS | Mod: S$PBB,,, | Performed by: INTERNAL MEDICINE

## 2023-04-11 PROCEDURE — 99999 PR PBB SHADOW E&M-EST. PATIENT-LVL III: ICD-10-PCS | Mod: PBBFAC,,, | Performed by: INTERNAL MEDICINE

## 2023-04-11 PROCEDURE — 3078F DIAST BP <80 MM HG: CPT | Mod: CPTII,,, | Performed by: INTERNAL MEDICINE

## 2023-04-11 PROCEDURE — 1159F PR MEDICATION LIST DOCUMENTED IN MEDICAL RECORD: ICD-10-PCS | Mod: CPTII,,, | Performed by: INTERNAL MEDICINE

## 2023-04-11 PROCEDURE — 3078F PR MOST RECENT DIASTOLIC BLOOD PRESSURE < 80 MM HG: ICD-10-PCS | Mod: CPTII,,, | Performed by: INTERNAL MEDICINE

## 2023-04-11 PROCEDURE — 99213 OFFICE O/P EST LOW 20 MIN: CPT | Mod: PBBFAC | Performed by: INTERNAL MEDICINE

## 2023-04-11 PROCEDURE — 3074F PR MOST RECENT SYSTOLIC BLOOD PRESSURE < 130 MM HG: ICD-10-PCS | Mod: CPTII,,, | Performed by: INTERNAL MEDICINE

## 2023-04-11 PROCEDURE — 3008F BODY MASS INDEX DOCD: CPT | Mod: CPTII,,, | Performed by: INTERNAL MEDICINE

## 2023-04-11 PROCEDURE — 99205 OFFICE O/P NEW HI 60 MIN: CPT | Mod: S$PBB,,, | Performed by: INTERNAL MEDICINE

## 2023-04-11 PROCEDURE — 1159F MED LIST DOCD IN RCRD: CPT | Mod: CPTII,,, | Performed by: INTERNAL MEDICINE

## 2023-04-11 PROCEDURE — 3008F PR BODY MASS INDEX (BMI) DOCUMENTED: ICD-10-PCS | Mod: CPTII,,, | Performed by: INTERNAL MEDICINE

## 2023-04-11 PROCEDURE — 3074F SYST BP LT 130 MM HG: CPT | Mod: CPTII,,, | Performed by: INTERNAL MEDICINE

## 2023-04-11 PROCEDURE — 99999 PR PBB SHADOW E&M-EST. PATIENT-LVL III: CPT | Mod: PBBFAC,,, | Performed by: INTERNAL MEDICINE

## 2023-04-11 NOTE — PROGRESS NOTES
4/11/23 TUMOR BOARD CONFERENCE:    Patient initially seen at [facility], but transferred care to [facility]. Biopsy at [facility] did not show receptors status. Patient is not inclined to proceed with surgery at this time. Discussion about possible endocrine therapy, but will need additional biopsy to assess if she is a candidate. Concern from pathology that there is an invasive component based on outside slides. Team agrees to additional biopsy in order to plan next steps. Radiology questioned re-biopsy of previous site given concern for possible invasive vs biopsy separate site. Will plan to schedule patient for two site biopsy.

## 2023-04-11 NOTE — Clinical Note
This lady needs a two site biopsy. Elder will be calling her today most likely to explain the tumor board plan so you could wait to call her to set up until tomorrow.   Thank ya!!

## 2023-04-11 NOTE — PROGRESS NOTES
Subjective     Patient ID: Amina Salazar is a 52 y.o. female.    Chief Complaint: No chief complaint on file.    HPI  Mrs Salazar is a 52-year-old female who had a biopsy at [facility] that showed DCIS.  Her biopsy was reviewed at our facility.  It was felt that it showed at least ductal carcinoma in situ (DCIS), high-grade, cribriform and micropapillary types, with comedonecrosis.  The interpretation was severely limited due to poor tissue fixation and preservation and it was felt to be suspicious for invasive carcinoma as well.  Receptors were not run    The patient decided to seek care within our system and has seen Dr. Holder.  She is scheduled for a repeat biopsy on April 20th.    PAST MEDICAL HISTORY:  Unremarkable.  PAST SURGICAL HISTORY:  Significant for 2 C sections.  SOCIAL HISTORY:  She is single and used to work as a  she stopped working last month.  She has been a smoker for about 15 years up to half a pack a day.  She does not drink  FAMILY HISTORY:  Mother had breast cancer  GYN HISTORY:  Menarche was at 13 1st live birth was at 14.  She is still premenopausal.    Review of Systems  Overall she feels well. She denies any anxiety, depression, easy bruising, fevers, chills, night  sweats, weight loss, nausea, vomiting, diarrhea, constipation, diplopia,     blurred vision, headache, chest pain, palpitations, shortness of breath, breast pain, abdominal pain, extremity pain, or difficulty ambulating.  The remainder of the ten-point ROS, including general, skin, lymph, H/N, cardiorespiratory, GI, , Neuro, Endocrine, and psychiatric is negative.       Objective     Physical Exam    She is alert, oriented to time, place, person, pleasant, well      nourished, in no acute physical distress.                                    VITAL SIGNS:  Reviewed                                      HEENT:  Normal.  There are no nasal, oral, lip, gingival, auricular, lid,    or conjunctival  "lesions.  Mucosae are moist and pink, and there is no        thrush.  Pupils are equal, reactive to light and accommodation.              Extraocular muscle movements are intact.  Dentition is good.  There is no frontal or maxillary tenderness.                                     NECK:  Supple without JVD, adenopathy, or thyromegaly.                       LUNGS:  Clear to auscultation without wheezing, rales, or rhonchi.           CARDIOVASCULAR:  Reveals an S1, S2, no murmurs, no rubs, no gallops.         ABDOMEN:  Soft, nontender, without organomegaly.  Bowel sounds are    present.                                                                     EXTREMITIES:  No cyanosis, clubbing, or edema.                               BREASTS:  No masses are palpated in either breast..                                     LYMPHATIC:  There is no cervical, axillary, or supraclavicular adenopathy.   SKIN:  Warm and moist, without petechiae, rashes, induration, or ecchymoses.           NEUROLOGIC:  DTRs are 0-1+ bilaterally, symmetrical, motor function is 5/5,  and cranial nerves are  within normal limits.      Assessment and Plan     Problem List Items Addressed This Visit       Ductal carcinoma in situ (DCIS) of left breast; biopsy is deemed to be suboptimal and invasive cancer can not be excluded        PLAN  I would a very long discussion with Mrs. Salazar.  I also reviewed her biopsy slides today during a weekly breast conference.  She states that she does not want to have surgery but she is willing to undergo a biopsy.  We went over the differences between invasive cancer and DCIS.  I urged her in no uncertain terms to proceed with a biopsy and I will meet with her after the biopsy to discuss the results.  She asked me about "holistic approaches" in lieu of surgery and I told her that there is none I would recommend.   At this point we will proceed as follows:  The patient will undergo a biopsy on April 20th  I will see her " around the end of April to discuss the biopsy results.  Her multiple questions were answered to her satisfaction.  I spent approximately 60 minutes reviewing the available records and evaluating the patient, out of which over 50% of the time was spent face to face with the patient in counseling and coordinating this patient's care.Route Chart for Scheduling    Med Onc Chart Routing      Follow up with physician Other. See me around the end of April   Follow up with MADDY    Infusion scheduling note    Injection scheduling note    Labs    Imaging    Pharmacy appointment    Other referrals

## 2023-04-12 ENCOUNTER — TELEPHONE (OUTPATIENT)
Dept: RADIOLOGY | Facility: HOSPITAL | Age: 53
End: 2023-04-12
Payer: MEDICAID

## 2023-04-12 ENCOUNTER — TELEPHONE (OUTPATIENT)
Dept: SURGERY | Facility: CLINIC | Age: 53
End: 2023-04-12
Payer: MEDICAID

## 2023-04-12 NOTE — TELEPHONE ENCOUNTER
Spoke with patient regarding tumor board recommendations. Patient is agreeable to 2 site biopsy. She is scheduled for 4/21. Patient verbalized understanding. All questions asked and answered.

## 2023-04-12 NOTE — TELEPHONE ENCOUNTER
Spoke with patient and rescheduled breast imaging and breast biopsy for 4/21/2023 at the breast center.

## 2023-04-19 ENCOUNTER — TELEPHONE (OUTPATIENT)
Dept: SURGERY | Facility: CLINIC | Age: 53
End: 2023-04-19
Payer: MEDICAID

## 2023-04-19 NOTE — TELEPHONE ENCOUNTER
Spoke with patient, scheduled appt with Dr. Marina. Reviewed location of appt. Patient verbalized understanding

## 2023-04-20 ENCOUNTER — PATIENT MESSAGE (OUTPATIENT)
Dept: HEMATOLOGY/ONCOLOGY | Facility: CLINIC | Age: 53
End: 2023-04-20
Payer: MEDICAID

## 2023-04-21 ENCOUNTER — HOSPITAL ENCOUNTER (OUTPATIENT)
Dept: RADIOLOGY | Facility: HOSPITAL | Age: 53
Discharge: HOME | End: 2023-04-21
Attending: SURGERY
Payer: MEDICAID

## 2023-04-21 DIAGNOSIS — C50.312 MALIGNANT NEOPLASM OF LOWER-INNER QUADRANT OF LEFT FEMALE BREAST, UNSPECIFIED ESTROGEN RECEPTOR STATUS: ICD-10-CM

## 2023-04-21 DIAGNOSIS — D05.12 DUCTAL CARCINOMA IN SITU (DCIS) OF LEFT BREAST: ICD-10-CM

## 2023-04-21 DIAGNOSIS — R92.8 ABNORMAL MAMMOGRAM OF LEFT BREAST: Primary | ICD-10-CM

## 2023-04-21 PROCEDURE — 88305 TISSUE EXAM BY PATHOLOGIST: CPT | Performed by: STUDENT IN AN ORGANIZED HEALTH CARE EDUCATION/TRAINING PROGRAM

## 2023-04-21 PROCEDURE — 77061 MAMMO DIGITAL DIAGNOSTIC LEFT WITH TOMO: ICD-10-PCS | Mod: 26,LT,, | Performed by: RADIOLOGY

## 2023-04-21 PROCEDURE — 77061 BREAST TOMOSYNTHESIS UNI: CPT | Mod: TC,LT

## 2023-04-21 PROCEDURE — 77065 MAMMO DIGITAL DIAGNOSTIC LEFT WITH TOMO: ICD-10-PCS | Mod: 26,LT,, | Performed by: RADIOLOGY

## 2023-04-21 PROCEDURE — 77065 DX MAMMO INCL CAD UNI: CPT | Mod: 26,LT,, | Performed by: RADIOLOGY

## 2023-04-21 PROCEDURE — 88342 IMHCHEM/IMCYTCHM 1ST ANTB: CPT | Mod: 26,XU,, | Performed by: STUDENT IN AN ORGANIZED HEALTH CARE EDUCATION/TRAINING PROGRAM

## 2023-04-21 PROCEDURE — 27200939 MAMMO BREAST STEREOTACTIC BREAST BIOPSY LEFT

## 2023-04-21 PROCEDURE — 88342 IMHCHEM/IMCYTCHM 1ST ANTB: CPT | Mod: 59 | Performed by: STUDENT IN AN ORGANIZED HEALTH CARE EDUCATION/TRAINING PROGRAM

## 2023-04-21 PROCEDURE — 88341 PR IHC OR ICC EACH ADD'L SINGLE ANTIBODY  STAINPR: ICD-10-PCS | Mod: 26,59,, | Performed by: STUDENT IN AN ORGANIZED HEALTH CARE EDUCATION/TRAINING PROGRAM

## 2023-04-21 PROCEDURE — 88342 CHG IMMUNOCYTOCHEMISTRY: ICD-10-PCS | Mod: 26,XU,, | Performed by: STUDENT IN AN ORGANIZED HEALTH CARE EDUCATION/TRAINING PROGRAM

## 2023-04-21 PROCEDURE — 19081 BX BREAST 1ST LESION STRTCTC: CPT | Mod: LT,,, | Performed by: RADIOLOGY

## 2023-04-21 PROCEDURE — 88360 PR  TUMOR IMMUNOHISTOCHEM/MANUAL: ICD-10-PCS | Mod: 26,,, | Performed by: STUDENT IN AN ORGANIZED HEALTH CARE EDUCATION/TRAINING PROGRAM

## 2023-04-21 PROCEDURE — 88305 TISSUE EXAM BY PATHOLOGIST: ICD-10-PCS | Mod: 26,,, | Performed by: STUDENT IN AN ORGANIZED HEALTH CARE EDUCATION/TRAINING PROGRAM

## 2023-04-21 PROCEDURE — 88305 TISSUE EXAM BY PATHOLOGIST: CPT | Mod: 26,,, | Performed by: STUDENT IN AN ORGANIZED HEALTH CARE EDUCATION/TRAINING PROGRAM

## 2023-04-21 PROCEDURE — 77065 DX MAMMO INCL CAD UNI: CPT | Mod: TC,LT

## 2023-04-21 PROCEDURE — 77061 BREAST TOMOSYNTHESIS UNI: CPT | Mod: 26,LT,, | Performed by: RADIOLOGY

## 2023-04-21 PROCEDURE — 88360 TUMOR IMMUNOHISTOCHEM/MANUAL: CPT | Mod: 26,,, | Performed by: STUDENT IN AN ORGANIZED HEALTH CARE EDUCATION/TRAINING PROGRAM

## 2023-04-21 PROCEDURE — 25000003 PHARM REV CODE 250: Performed by: SURGERY

## 2023-04-21 PROCEDURE — 19081 MAMMO BREAST STEREOTACTIC BREAST BIOPSY LEFT: ICD-10-PCS | Mod: LT,,, | Performed by: RADIOLOGY

## 2023-04-21 PROCEDURE — 88341 IMHCHEM/IMCYTCHM EA ADD ANTB: CPT | Mod: 26,59,, | Performed by: STUDENT IN AN ORGANIZED HEALTH CARE EDUCATION/TRAINING PROGRAM

## 2023-04-21 PROCEDURE — 88341 IMHCHEM/IMCYTCHM EA ADD ANTB: CPT | Mod: 59 | Performed by: STUDENT IN AN ORGANIZED HEALTH CARE EDUCATION/TRAINING PROGRAM

## 2023-04-21 PROCEDURE — 88360 TUMOR IMMUNOHISTOCHEM/MANUAL: CPT | Mod: 59 | Performed by: STUDENT IN AN ORGANIZED HEALTH CARE EDUCATION/TRAINING PROGRAM

## 2023-04-21 RX ORDER — LIDOCAINE HYDROCHLORIDE 10 MG/ML
3 INJECTION INFILTRATION; PERINEURAL ONCE
Status: COMPLETED | OUTPATIENT
Start: 2023-04-21 | End: 2023-04-21

## 2023-04-21 RX ORDER — BUPIVACAINE HCL/EPINEPHRINE 0.5-1:200K
20 VIAL (ML) INJECTION ONCE
Status: COMPLETED | OUTPATIENT
Start: 2023-04-21 | End: 2023-04-21

## 2023-04-21 RX ADMIN — BUPIVACAINE HYDROCHLORIDE AND EPINEPHRINE BITARTRATE 20 ML: 5; .005 INJECTION, SOLUTION PERINEURAL at 11:04

## 2023-04-21 RX ADMIN — LIDOCAINE HYDROCHLORIDE 3 ML: 10 INJECTION, SOLUTION INFILTRATION; PERINEURAL at 11:04

## 2023-04-25 ENCOUNTER — OFFICE VISIT (OUTPATIENT)
Dept: HEMATOLOGY/ONCOLOGY | Facility: CLINIC | Age: 53
End: 2023-04-25
Payer: MEDICAID

## 2023-04-25 VITALS — SYSTOLIC BLOOD PRESSURE: 105 MMHG | HEART RATE: 87 BPM | OXYGEN SATURATION: 95 % | DIASTOLIC BLOOD PRESSURE: 70 MMHG

## 2023-04-25 DIAGNOSIS — C50.312 MALIGNANT NEOPLASM OF LOWER-INNER QUADRANT OF LEFT FEMALE BREAST: ICD-10-CM

## 2023-04-25 DIAGNOSIS — M54.50 CHRONIC LOW BACK PAIN: ICD-10-CM

## 2023-04-25 DIAGNOSIS — F41.9 ANXIETY: ICD-10-CM

## 2023-04-25 DIAGNOSIS — G89.29 CHRONIC LOW BACK PAIN: ICD-10-CM

## 2023-04-25 DIAGNOSIS — F17.200 CURRENT EVERY DAY SMOKER: Primary | ICD-10-CM

## 2023-04-25 LAB
COMMENT: NORMAL
FINAL PATHOLOGIC DIAGNOSIS: NORMAL
GROSS: NORMAL
Lab: NORMAL
MICROSCOPIC EXAM: NORMAL

## 2023-04-25 PROCEDURE — 3078F PR MOST RECENT DIASTOLIC BLOOD PRESSURE < 80 MM HG: ICD-10-PCS | Mod: CPTII,,, | Performed by: OBSTETRICS & GYNECOLOGY

## 2023-04-25 PROCEDURE — 99215 PR OFFICE/OUTPT VISIT, EST, LEVL V, 40-54 MIN: ICD-10-PCS | Mod: S$PBB,,, | Performed by: OBSTETRICS & GYNECOLOGY

## 2023-04-25 PROCEDURE — 3074F PR MOST RECENT SYSTOLIC BLOOD PRESSURE < 130 MM HG: ICD-10-PCS | Mod: CPTII,,, | Performed by: OBSTETRICS & GYNECOLOGY

## 2023-04-25 PROCEDURE — 3074F SYST BP LT 130 MM HG: CPT | Mod: CPTII,,, | Performed by: OBSTETRICS & GYNECOLOGY

## 2023-04-25 PROCEDURE — 99999 PR PBB SHADOW E&M-EST. PATIENT-LVL II: CPT | Mod: PBBFAC,,, | Performed by: OBSTETRICS & GYNECOLOGY

## 2023-04-25 PROCEDURE — 99215 OFFICE O/P EST HI 40 MIN: CPT | Mod: S$PBB,,, | Performed by: OBSTETRICS & GYNECOLOGY

## 2023-04-25 PROCEDURE — 99999 PR PBB SHADOW E&M-EST. PATIENT-LVL II: ICD-10-PCS | Mod: PBBFAC,,, | Performed by: OBSTETRICS & GYNECOLOGY

## 2023-04-25 PROCEDURE — 99212 OFFICE O/P EST SF 10 MIN: CPT | Mod: PBBFAC | Performed by: OBSTETRICS & GYNECOLOGY

## 2023-04-25 PROCEDURE — 3078F DIAST BP <80 MM HG: CPT | Mod: CPTII,,, | Performed by: OBSTETRICS & GYNECOLOGY

## 2023-04-25 NOTE — PROGRESS NOTES
Integrative [facility] and Medicine Initial Visit      Chief Complaint:  no appetite and weight loss    Patient arrives to [facility] today with a history of left breast DCIS (transitioning care from [facility]) diagnosed via bx 23. Pathology report demonstrated concerns for IDC with no tumor markers. Repeat biopsy performed at [facility], results are pending. Currently under the care of Dr. Holder and Dr. Schumacher.     Patient is experiencing anxiety pending breast biopsy results. She reports that she does not believe that she has breast cancer and is inclined to proceed with no interventions. She admits being fearful of surgery. Current smoker noting great challenge to quit. Former . Mother had hx of breast cancer. Acupuncture denied. She is doing smoking cessation program at [facility]- quit cold [place] for 1 week. She has 17 grandchildren that she wants to live for.     Cancer/Stage/TNM:   Cancer Staging   No matching staging information was found for the patient.     Oncology History    No history exists.         No past medical history on file.     Current Outpatient Medications   Medication Instructions    LORazepam (ATIVAN) 1 mg, Oral, See admin instructions, Take 1 hour prior to the MRI exam. Do not drive when taking this medication. Do not combine with other benzodiazepines.    multivitamin with minerals tablet 1 tablet, Oral, Daily        Past Surgical History:   Procedure Laterality Date     SECTION            Distress Score:        Today the patient described the following:  Nutrition: neither fruits nor vegetables  Exercise: less than 30 minutes per day  Sleep: less than 6 hours   Spiritual health:  avril in God  Emotional health: no change  Alcohol: no  Smoking: yes      7 Pillars Assessment      Sleep  How many hours of sleep per night? Varies in  hours  Do you have trouble falling asleep, staying asleep or waking up earlier than you need to? yes  Do you have daytime fatigue?  yes  Do you need medication for sleep? no  Do you use any supplements or other interventions for sleep? no    Resilience  Rate your current level of stress- high  How do you manage stress?  Time with family    Purpose  Do you feel you have a vision or a life purpose? Yes    Environment  Any exposures:no known exposures    Spirituality-  prayers    Nutrition   Food allergies or sensitivities: no  Do you adhere to a particular type of diet? no  What type of diet do you follow? Less sugar, no white bread  Do you have any concerns with your eating habits? yes  Are you concerned with your level of alcohol intake? no    Exercise  How would you describe your physical activity level? Minimal   Do you work at a sedentary job? no  What do you do for physical activity? walking      Physical Exam   There were no vitals taken for this visit.   Wt Readings from Last 3 Encounters:   04/11/23 68.7 kg (151 lb 5.9 oz)   03/20/23 68.9 kg (152 lb)   09/17/21 72.6 kg (160 lb)     Temp Readings from Last 3 Encounters:   04/11/23 98.3 °F (36.8 °C) (Oral)   03/20/23 98.6 °F (37 °C) (Oral)   01/26/23 98.5 °F (36.9 °C) (Oral)     BP Readings from Last 3 Encounters:   04/11/23 121/73   03/20/23 128/74   01/26/23 120/70     Pulse Readings from Last 3 Encounters:   04/11/23 78   03/20/23 110   01/26/23 88       Body mass index is There is no height or weight on file to calculate BMI.    Vitals reviewed. /70 Pulse 87 Pulse ox 95%  Constitutional:       General: Patient is not in acute distress.     Appearance: Normal appearance.   HENT:      Head: Normocephalic and atraumatic.      Nose: Nose normal.      Mouth/Throat:      Mouth: Mucous membranes are moist.     Pulmonary:      Effort: Pulmonary effort is normal.      Musculoskeletal:         General: Normal range of motion.      Cervical back: Normal range of motion and neck supple.   Skin:     General: Skin is warm and dry.   Neurological:      General: No focal deficit present.        Mental Status: Alert and oriented to person, place, and time. Mental status is at baseline.      Gait: Gait normal.   Psychiatric:         Mood and Affect: Mood normal.         Behavior: Behavior normal.         Thought Content: Thought content normal.         Judgment: Judgment normal.      Review of Systems:   Cardiac:           No SOB, chest pain with exertion,edema, orthopnea  Distress:          No excessive sadness, no hopelessness, no anhedonia, no excessive worry or nervousness  Cognitive:        No trouble with memory, no difficulty paying attention, no brain fog, no trouble functioning with work or home life  Fatigue:           Energy level adequate, performing ADL's, no morning fatigue                             Hormonal:       No hot flashes, no night sweats  Pain:                Has no pain,  location:                           Neuropathy:    No numbness, no tingling, no paresthesia   Sleep:              +difficulty falling asleep, +waking up in night, no daytime sleepiness, no snoring  Altered function:          No problems with money management,  no problems with daily organization & planning  Weight:           +concerns with weight,concerned about appetite and weight loss       Labs:   Lab Results   Component Value Date    WBC 8.22 03/20/2023    HGB 14.3 03/20/2023    HCT 43.9 03/20/2023    MCV 98 03/20/2023     03/20/2023           Hemoglobin A1C   Date Value Ref Range Status   06/01/2022 5.7 (H) 4.7 - 5.6 % Final   11/19/2020 5.8 (H) 4.7 - 5.6 % Final            Assessment:   Breast Cancer  Anxiety   Tobacco abuse  Weight loss  Plan   1. Nutrition: Continue to encourage plant based diet; given risk factors of  smoking  discussed anti-inflammatory diet as well as foods to decrease effects of diabetes and prediabetes. Patient is concerned about weight loss  2. Sleep: Recommend 6-8 hours of restful sleep nightly; recommend strong sleep hygiene routine one hour prior to bed. Discussed  relaxation, Melatonin 0.3-0.5mg and guided imagery prior to bed.   3. Mind Body Medicine: Continue Deep breathing exercise, yoga, and focus on gratitude   4. Exercise: Recommend 60 minutes of gentle movement/light activity per day (cleaning, walking, cooking, gardening, stationary bike). Recommend some type of cardiovascular activity daily if well.   5. Recommend a positive thought process through affirmations and visualizations.    6. Dietitian for Malnutrition.  7.  Psychology for anxiety, depression, situational adjustment disorder- patient declines  8. Smoking cessation - continue program at [facility]  9.  Follow-Up:  prn after treatment plan outlined    Face to Face Time With Patient: 60 min    Shared medical visit- nutrtiton, psychology, meditation, stretching

## 2023-04-26 ENCOUNTER — TELEPHONE (OUTPATIENT)
Dept: SURGERY | Facility: CLINIC | Age: 53
End: 2023-04-26
Payer: MEDICAID

## 2023-04-26 NOTE — TELEPHONE ENCOUNTER
Called and talked about the recent biopsy results.   Talked about the recommendation for surgery again.   Is thinking about unilateral or bilateral mastectomy. Discussed that reconstruction is not recommend if continuing to smoke. She is considering no reconstruction.   Not willing to undergo surgery at this time.   Would like to talk to medical oncology further about endocrine therapy.

## 2023-05-03 ENCOUNTER — OFFICE VISIT (OUTPATIENT)
Dept: HEMATOLOGY/ONCOLOGY | Facility: CLINIC | Age: 53
End: 2023-05-03
Payer: MEDICAID

## 2023-05-03 VITALS
OXYGEN SATURATION: 98 % | HEIGHT: 66 IN | BODY MASS INDEX: 23.77 KG/M2 | HEART RATE: 95 BPM | DIASTOLIC BLOOD PRESSURE: 65 MMHG | WEIGHT: 147.94 LBS | SYSTOLIC BLOOD PRESSURE: 136 MMHG | RESPIRATION RATE: 16 BRPM

## 2023-05-03 DIAGNOSIS — F41.9 ANXIETY: ICD-10-CM

## 2023-05-03 DIAGNOSIS — D05.12 DUCTAL CARCINOMA IN SITU (DCIS) OF LEFT BREAST: Primary | ICD-10-CM

## 2023-05-03 DIAGNOSIS — F17.200 CURRENT EVERY DAY SMOKER: ICD-10-CM

## 2023-05-03 PROCEDURE — 99213 OFFICE O/P EST LOW 20 MIN: CPT | Mod: PBBFAC | Performed by: STUDENT IN AN ORGANIZED HEALTH CARE EDUCATION/TRAINING PROGRAM

## 2023-05-03 PROCEDURE — 3008F PR BODY MASS INDEX (BMI) DOCUMENTED: ICD-10-PCS | Mod: CPTII,,, | Performed by: STUDENT IN AN ORGANIZED HEALTH CARE EDUCATION/TRAINING PROGRAM

## 2023-05-03 PROCEDURE — 1159F PR MEDICATION LIST DOCUMENTED IN MEDICAL RECORD: ICD-10-PCS | Mod: CPTII,,, | Performed by: STUDENT IN AN ORGANIZED HEALTH CARE EDUCATION/TRAINING PROGRAM

## 2023-05-03 PROCEDURE — 3078F PR MOST RECENT DIASTOLIC BLOOD PRESSURE < 80 MM HG: ICD-10-PCS | Mod: CPTII,,, | Performed by: STUDENT IN AN ORGANIZED HEALTH CARE EDUCATION/TRAINING PROGRAM

## 2023-05-03 PROCEDURE — 99215 PR OFFICE/OUTPT VISIT, EST, LEVL V, 40-54 MIN: ICD-10-PCS | Mod: S$PBB,,, | Performed by: STUDENT IN AN ORGANIZED HEALTH CARE EDUCATION/TRAINING PROGRAM

## 2023-05-03 PROCEDURE — 99999 PR PBB SHADOW E&M-EST. PATIENT-LVL III: CPT | Mod: PBBFAC,,, | Performed by: STUDENT IN AN ORGANIZED HEALTH CARE EDUCATION/TRAINING PROGRAM

## 2023-05-03 PROCEDURE — 3008F BODY MASS INDEX DOCD: CPT | Mod: CPTII,,, | Performed by: STUDENT IN AN ORGANIZED HEALTH CARE EDUCATION/TRAINING PROGRAM

## 2023-05-03 PROCEDURE — 3075F PR MOST RECENT SYSTOLIC BLOOD PRESS GE 130-139MM HG: ICD-10-PCS | Mod: CPTII,,, | Performed by: STUDENT IN AN ORGANIZED HEALTH CARE EDUCATION/TRAINING PROGRAM

## 2023-05-03 PROCEDURE — 3075F SYST BP GE 130 - 139MM HG: CPT | Mod: CPTII,,, | Performed by: STUDENT IN AN ORGANIZED HEALTH CARE EDUCATION/TRAINING PROGRAM

## 2023-05-03 PROCEDURE — 99999 PR PBB SHADOW E&M-EST. PATIENT-LVL III: ICD-10-PCS | Mod: PBBFAC,,, | Performed by: STUDENT IN AN ORGANIZED HEALTH CARE EDUCATION/TRAINING PROGRAM

## 2023-05-03 PROCEDURE — 3078F DIAST BP <80 MM HG: CPT | Mod: CPTII,,, | Performed by: STUDENT IN AN ORGANIZED HEALTH CARE EDUCATION/TRAINING PROGRAM

## 2023-05-03 PROCEDURE — 99215 OFFICE O/P EST HI 40 MIN: CPT | Mod: S$PBB,,, | Performed by: STUDENT IN AN ORGANIZED HEALTH CARE EDUCATION/TRAINING PROGRAM

## 2023-05-03 PROCEDURE — 1159F MED LIST DOCD IN RCRD: CPT | Mod: CPTII,,, | Performed by: STUDENT IN AN ORGANIZED HEALTH CARE EDUCATION/TRAINING PROGRAM

## 2023-05-03 RX ORDER — TAMOXIFEN CITRATE 20 MG/1
20 TABLET ORAL DAILY
Qty: 90 TABLET | Refills: 3 | Status: SHIPPED | OUTPATIENT
Start: 2023-05-03 | End: 2024-05-02

## 2023-05-03 NOTE — PROGRESS NOTES
[facility]      Chief Complaint: DCIS, HR+    Cancer Staging   No matching staging information was found for the patient.    HPI:  Amina Salazar is a 53yo woman who presents today for evaluation of newly diagnosed DCIS. Her oncologic history is as follows:    -MMG 3/17/23 showing L breast calcifications in lower inner quadrant spanning 5.0 x 4.9cm. Biopsy demonstrating at least DCIS, with surrounding outlines of atypical glandular proliferation, concerning for invasive carcinoma. Unable to determine definitive classification and biomarkers due to poor tissue fixation on sample  -3/29/23 MRI breast showing 45 x 38 x 26mm non-mass enhancement in the lower inner quadrant of the L breast; no suspicious findings in the Rt breast. No concerning LAD  -23 repeat biopsy confirming DCIS, ER %, WA 80%  -s/p genetic testing that was negative    Her medical history is otherwise unremarkable. Family history significant for her mother diagnosed with breast cancer; unsure at what age. Previously working as a  but recently stopped; now stays busy with projects around her house. Previous smoker x 15 years. Stopped for one week but has since started again-currently 1ppd. No history of blood clots.    Notes that she has lost approx 10 pounds since Feb due to stress surrounding this situation. TSH in  wnl. Explains that she feels overwhelmed and has no appetite. She also reports having hot flashes in recent months. Otherwise she has been feeling well; denies new complaints today.     Gyn History:   Menarche: 14yo  Menopause: perimenopausal     Age at first pregnancy: 15yo  : N  HRT:N    Social History     Tobacco Use    Smoking status: Every Day     Packs/day: 1.00     Types: Cigarettes    Smokeless tobacco: Never   Substance Use Topics    Alcohol use: No    Drug use: No     No family history on file.  No past  "medical history on file.    Past Surgical History:   Procedure Laterality Date     SECTION         Patient Active Problem List   Diagnosis    Decreased range of motion of left shoulder    Malignant neoplasm of lower-inner quadrant of left female breast    Ductal carcinoma in situ (DCIS) of left breast       Current Outpatient Medications   Medication Instructions    LORazepam (ATIVAN) 1 mg, Oral, See admin instructions, Take 1 hour prior to the MRI exam. Do not drive when taking this medication. Do not combine with other benzodiazepines.    multivitamin with minerals tablet 1 tablet, Oral, Daily       Review of Systems:   +anxiety  +weight loss  12pt ROS negative except as noted above    PHYSICAL EXAM:  /65   Pulse 95   Resp 16   Ht 5' 6" (1.676 m)   Wt 67.1 kg (147 lb 14.9 oz)   SpO2 98%   BMI 23.88 kg/m²     ECOG 0  General: well appearing, in no apparent distress  HEENT: Normocephalic, EOMI, anicteric sclerae, MMM  Neck: supple, without cervical or supraclavicular lymphadenopathy.  Heart: regular rate and rhythm, normal S1 and S2, no murmurs, gallops or rubs.  Lungs: Clear to auscultation bilaterally, no increased wob  Breast: no appreciable masses, skin changes or nipple abnormality bilaterally. L breast with firm scar tissue noted beneath biopsy site at 9oclock. No axillary LAD  Abdomen: Soft, nontender, nondistended with normal bowel sounds. No hepatosplenomegaly.  Extremities: No LE edema or joint effusion  Skin: warm, well-perfused, no rash  Neurologic: Alert and oriented x 4, normal speech and gait   Psychiatric: Conversing appropriately with providers throughout today's encounter.      Pertinent Labs & Imaging:  Personally reviewed all recent labs, imaging and pathology.     Assessment & Plan:  Ms. Salazar is a mar 51yo woman with recently diagnosed DICS who presents today for evaluation.    Today we discussed her recent diagnosis and the natural history of DCIS. I explained that DCIS " is non-invasive cancer and the goal is not to decrease the risk of distant recurrence as there is no indication of invasive disease. Rather, endocrine therapy provides risk reduction in the ipsilateral breast treated with breast conservation and in the contralateral breast.    She is perimenopausal and would recommend tamoxifen 20mg daily with a goal of 5 years of treatment (or potentially longer depending on her surgery plans). We also discussed recent data per TERRELL-01 demonstrating that low-dose tamoxifen x 3 years lowers recurrence from noninvasive breast cancer at 10 years, with carryover effect and without significant adverse events. However, given that she is unsure about  proceeding with surgery would favor starting with full dose tamoxifen at this time.I reiterated that surgery and radiation serve as definitive treatment for DCIS; while endocrine therapy is for risk reduction.      We reviewed common side effects of tamoxifen including hot flashes, invasive endometrial cancer in women > 49 years of age (2.3/1000 compared to 0.9/1000), cataracts, increased risk of pulmonary embolism among others.       #DCIS:  --will initiate tamoxifen 20mg daily  --counseled on increased risk and signs/symptoms of blood clots, particularly given that she continues to smoke  --she would like to continue to think about surgery and will be in touch with her decision  --in the meantime, plan for close interval follow up with diagnostic MMG in June  --following with integrative oncology  --declined referral to psych onc; will consider if stress does not improve    #Weight loss:  --suspect 2/2 recent stress surrounding diagnosis. TSH previously wnl  --will cont to monitor closely and if weight loss continues, will proceed with additional workup    #Tobacco use:  --encouraged smoking cessation as above  --has chantix at home and plans to try this to assist    Reviewed patients referring notes, imaging and pathology. Discussed  diagnosis, staging, and treatment in detail with patient. All questions weer answered to her apparent satisfaction. Will plan to see her back on 6/14 or sooner should the need arise.    Route Chart for Scheduling    Med Onc Chart Routing      Follow up with physician 6 weeks. RTC 6/14   Follow up with MADDY    Infusion scheduling note none   Injection scheduling note none   Labs None   Scheduling:  Preferred lab:  Lab interval:     Imaging Mammogram   has this scheduled 6/14 already   Pharmacy appointment    Other referrals                Total time of this visit, including time spent face to face with patient and/or via video/audio, and also in preparing for today's visit for MDM and documentation. (Medical Decision Making, including consideration of possible diagnoses, management options, complex medical record review, review of diagnostic tests and information, consideration and discussion of significant complications based on comorbidities, and discussion with providers involved with the care of the patient) 40 minutes. Greater than 50% was spent face to face with the patient counseling and coordinating care.    Analy Marina

## 2023-05-10 ENCOUNTER — CLINICAL SUPPORT (OUTPATIENT)
Dept: HEMATOLOGY/ONCOLOGY | Facility: CLINIC | Age: 53
End: 2023-05-10
Payer: MEDICAID

## 2023-05-10 VITALS — WEIGHT: 147 LBS | HEIGHT: 66 IN | BODY MASS INDEX: 23.63 KG/M2

## 2023-05-10 DIAGNOSIS — Z71.3 NUTRITIONAL COUNSELING: Primary | ICD-10-CM

## 2023-05-10 DIAGNOSIS — D05.12 DUCTAL CARCINOMA IN SITU (DCIS) OF LEFT BREAST: ICD-10-CM

## 2023-05-10 DIAGNOSIS — R63.4 WEIGHT LOSS, UNINTENTIONAL: ICD-10-CM

## 2023-05-10 PROCEDURE — 97802 MEDICAL NUTRITION INDIV IN: CPT | Mod: 95,,, | Performed by: DIETITIAN, REGISTERED

## 2023-05-10 PROCEDURE — 97802 PR MED NUTR THER, 1ST, INDIV, EA 15 MIN: ICD-10-PCS | Mod: 95,,, | Performed by: DIETITIAN, REGISTERED

## 2023-05-10 NOTE — PROGRESS NOTES
Audio Only Telehealth Visit     The patient location is: [place]   The chief complaint leading to consultation is: weight loss   Visit type: Virtual visit with audio only (telephone)  Total time spent with patient: 29 minutes      The reason for the audio only service rather than synchronous audio and video virtual visit was related to technical difficulties or patient preference/necessity.     Each patient to whom I provide medical services by telemedicine is:  (1) informed of the relationship between the physician and patient and the respective role of any other health care provider with respect to management of the patient; and (2) notified that they may decline to receive medical services by telemedicine and may withdraw from such care at any time. Patient verbally consented to receive this service via voice-only telephone call.     This service was not originating from a related E/M service provided within the previous 7 days nor will  to an E/M service or procedure within the next 24 hours or my soonest available appointment.  Prevailing standard of care was able to be met in this audio-only visit.       Oncology Nutrition Assessment for Medical Nutrition Therapy  Initial Visit    Amina Salazar   1970    Referring Provider:  Yoanna Gomez, *      Reason for Visit: Pt in for education and nutrition counseling     PMHx: No past medical history on file.    Nutrition Assessment    This is a 52 y.o.female with newly diagnosed DCIS. Being treated with Tamoxifen. Referred to nutrition through integrative oncology.   She reports unintentional weight loss of about 20lbs. Reports this was likely related to stress/anxiety of diagnosis. She does not eat as much as she used to and appetite is not as good as normal. However, she does report stress/anxiety has improved. She is making fruit smoothies, eating a lot of tuna, chicken and vegetables. Eating twice per day on average. She drinks a lot of teas  "and water (5 bottles per day). One soda per week max (ginger ale). Also supplementing with Boost high protein but not consistently.   Walks for exercise and climbs stairs at her apartment.     Weight:66.7 kg (147 lb)  Height:5' 6" (1.676 m)  BMI:Body mass index is 23.73 kg/m².   IBW: Ideal body weight: 59.3 kg (130 lb 11.7 oz)  Adjusted ideal body weight: 62.3 kg (137 lb 3.8 oz)    Usual BW: 165lb   Weight Change:147lb     Allergies: Patient has no known allergies.    Current Medications:    Current Outpatient Medications:     LORazepam (ATIVAN) 1 MG tablet, Take 1 tablet (1 mg total) by mouth As instructed for Anxiety. Take 1 hour prior to the MRI exam. Do not drive when taking this medication. Do not combine with other benzodiazepines., Disp: 1 tablet, Rfl: 0    multivitamin with minerals tablet, Take 1 tablet by mouth once daily., Disp: , Rfl:     tamoxifen (NOLVADEX) 20 MG Tab, Take 1 tablet (20 mg total) by mouth once daily., Disp: 90 tablet, Rfl: 3    Vitamins/Supplements: Centrum multi, cinnamon, Vitamin C, Vitamin D, hair/skin/nails     Labs: Reviewed from 3/20/23  Also noted most recent A1c (about 1 year ago) was 5.7    Nutrition Diagnosis    Problem: inadequate protein/energy intake  Etiology (related to):  stress and anxiety (improving)   Signs/Symptoms (as evidenced by): weight loss    Nutrition Intervention    Nutrition Prescription   1700 Kcals (25kcal/kg)  65 g protein (1g/kg)   2000 mL fluid (30mL/kg)    Recommendations:  Small meal or snack every 3-4 hours   High protein-calorie foods and snacks  -peanut butter, nuts/trail mix, full fat Greek yogurt, avocados  Boost High Protein in the evening   Remain active   4 bottles of water per day   Avoid processed meat, red meat, added sugar as much as possible   -use small amounts of natural sugars     Nutrition Monitoring and Evaluation    Monitor: energy intake, diet tolerance , and weight    Goals: weight stable or gain    Follow up Patient provided with " dietitian contact number and advised to call with questions or make future appointment if further intervention is needed.    Communication to referring provider/care team: note available in chart     Counseling time: 30 Minutes    Jazmyn Salmeron, MPH, RD, , LDN, FAND   [phone]

## 2023-06-13 NOTE — PROGRESS NOTES
Breast Surgery  [facility]  Department of Surgery      REFERRING PROVIDER: No referring provider defined for this encounter.    Chief Complaint: Follow-up (6 mo f/u)      Subjective:      Patient ID: Amina Salazar is a 52 y.o. female who presents with left breast Ductal Carcinoma in Situ.     She presented for screening mammogram on 01/24/2023 for yearly scheduled screening. This identified calcifications in the left breast. Follow-up mammogram on 01/30/2023 showed an area of amorphous calcifications in the lower inner quadrant. A stereotactic biopsy was performed on 02/06/2023 with pathology revealing ductal carcinoma in-situ of the breast.     The patient's initial imaging was performed at outside institution.  Our radiologist reviewed her imaging and found the area of calcifications to be slightly larger.  We measured approximately 5 x 4.9 cm of calcifications in the lower inner quadrant.    Pathology from an outside institution showed high-grade DCIS with mask with concern for possible invasion.  Hormone receptor testing was not able to be performed.    She was seen for evaluation for surgery and reconstruction at the outside I facility.  She was recommended quit smoking but has not been able to.  She reports that she does not believe that she has breast cancer and is inclined to proceed with no interventions.    Findings at that time were the following:   Lesion 1:    Location:  Left, lower inner quadrant   Clip:  No post biopsy mammogram available for review  Tumor size:  5 cm   Tumor ndgndrndanddndend:nd nd2nd Estrogen Receptor:  Unknown   Progesterone Receptor:  Unknown     Patient has not noted a change on breast exam.  Patient denies nipple discharge. Patient denies previous breast biopsy. Patient denies personal history of breast cancer. Family history includes mother with breast cancer at 50, cousin with breast cancer at 50, maternal uncle bone cancer at 55..     GYN History:  Age of menarche was 13.   Possibly perimenopausal only recently stopped birth control shots.  Patient denies hormonal therapy. Patient is . Age of first live birth was 15. Patient did not breast feed.    Interval history 23:  Since her last visit, she had undergone an MRI on 3/29 which showed 45 mm x 38 mm x 26 mm heterogeneous, non-mass enhancement in a segmental distribution seen in the lower inner quadrant of the left breast, 4.3 cm from the nipple and 0.6 cm from the chest wall in the left breast which corresponded to prior calcs seen on mammogram. She then underwent a stereotatic guided biopsy on 23 which showed DCIS. Initially, there was a second biopsy planned for that date for calcifications more posteriorly (closer to the original biopsy site) but she was unable to tolerate this. After the biopsy, she did not wish to undergo surgery but was considering her options, however she was amenable to endocrine therapy and started on tamoxifen  on 5/3/23. At this time, she continues to smoke.     Location:  Left, lower inner quadrant   Clip:  The X shaped marker placed today is located 3.2 cm anterior to the T shaped marker (top ) placed at the outside institution but within the lesion.  Tumor size:  4.5 cm   Tumor thgthrthathdtheth:th th4th Estrogen Receptor:  positive  Progesterone Receptor:  positive      Past Medical History:   Diagnosis Date    Breast cancer      Past Surgical History:   Procedure Laterality Date    BREAST BIOPSY Left 2023     SECTION       Current Outpatient Medications on File Prior to Visit   Medication Sig Dispense Refill    LORazepam (ATIVAN) 1 MG tablet Take 1 tablet (1 mg total) by mouth As instructed for Anxiety. Take 1 hour prior to the MRI exam. Do not drive when taking this medication. Do not combine with other benzodiazepines. 1 tablet 0    multivitamin with minerals tablet Take 1 tablet by mouth once daily.      tamoxifen (NOLVADEX) 20 MG Tab Take 1 tablet (20 mg total) by mouth once  "daily. 90 tablet 3     No current facility-administered medications on file prior to visit.     Social History     Socioeconomic History    Marital status: Single   Tobacco Use    Smoking status: Every Day     Packs/day: 1.00     Types: Cigarettes    Smokeless tobacco: Never   Substance and Sexual Activity    Alcohol use: No    Drug use: No     History reviewed. No pertinent family history.     Review of Systems   All other systems reviewed and are negative.  Objective:   /62 (BP Location: Right arm, Patient Position: Sitting, BP Method: Medium (Automatic))   Pulse 94   Ht 5' 6" (1.676 m)   Wt 67.6 kg (149 lb)   BMI 24.05 kg/m²     Physical Exam   Vitals reviewed.  Constitutional: She is oriented to person, place, and time.   HENT:   Head: Normocephalic and atraumatic.   Cardiovascular:  Normal rate.            No murmur heard.  Pulmonary/Chest: Effort normal. No respiratory distress. Right breast exhibits no inverted nipple, no mass, no nipple discharge, no skin change and no tenderness. Left breast exhibits no inverted nipple, no mass, no nipple discharge, no skin change and no tenderness.   Abdominal: Normal appearance.   Musculoskeletal: Lymphadenopathy:      Cervical: No cervical adenopathy.      Upper Body:      Right upper body: No supraclavicular or axillary adenopathy.      Left upper body: No supraclavicular or axillary adenopathy.     Neurological: She is alert and oriented to person, place, and time.   Skin: Skin is warm and dry.     Psychiatric: Her behavior is normal. Mood, judgment and thought content normal.     Radiology review: Images personally reviewed by me in the clinic and shown to the patient during the consultation.     6/14/23  History:  Patient is 52 y.o. and is seen for diagnostic imaging. Left breast DCIS. On Tamoxifen. Patient opts for medical treatment at this time.      Films Compared:  Prior images (if available) were compared.     Findings:  This procedure was performed " "using tomosynthesis. Computer-aided detection was utilized in the interpretation of this examination.  The left breast has scattered areas of fibroglandular density.     There are 45 mm x 48 mm amorphous calcifications in a segmental distribution seen in the lower inner quadrant of the left breast. Compared to the previous study, there are no significant changes given differences in positioning. There is a tophat marker posteriorly and X marker anteriorly within the calcifications, both sites showing DCIS.      Impression:  Left  Calcifications: Left breast 45 mm x 48 mm calcifications at the lower inner position, consistent with known DCIS. No significant change. Assessment: 6 - Known biopsy, proven malignancy.      BI-RADS Category:   Overall: 6 - Known Biopsy-Proven Malignancy        Recommendation:  Continued clinical management of known DCIS. Patient declines surgical intervention at this time. She will be due for annual imaging in January.    Assessment:       1. Malignant neoplasm of lower-inner quadrant of left female breast, unspecified estrogen receptor status            Plan:   We discussed that standard of care would be to proceed with surgery. She desires to delay surgery for a few months. Currently taking Tamoxifen. Imaging unchanged from prior.       We discussed the options for breast reconstruction the setting of mastectomy.  We discussed that plastic surgeons usually do not offer reconstruction if the patient is a current nicotine user.  The patient is currently smoking and has failed attempts to quit. She is amenable to mastectomy, however would like to pursue endocrine therapy for 3 months to "optimize her health" with her current dietary changes and to attempt smoking cessation. We discussed the risks of this approach.     We discussed sentinel lymph node biopsy in full detail including the risks and benefits as this is indicated due to her need for mastectomy.    We discussed the role of " adjuvant radiation therapy in breast conservation.  We discussed this is not typically done with mastectomy unless breast cancer is found to be a large invasive cancer there is positive lymph nodes.    She met with medical oncology today for follow up.     She does meet NCCN guidelines for genetic testing based on her family history. Genetic testing was performed after her last visit and was negative.    She will follow up in 3 months to discuss surgery. During this time, she will attempt smoking cessation to be a candidate for reconstruction. Continue endocrine therapy.     20 minutes were spent on this encounter, 15 of which was face to face counseling and 5 minutes were spent on chart review and coordination of care.

## 2023-06-14 ENCOUNTER — OFFICE VISIT (OUTPATIENT)
Dept: SURGERY | Facility: CLINIC | Age: 53
End: 2023-06-14
Payer: MEDICAID

## 2023-06-14 ENCOUNTER — OFFICE VISIT (OUTPATIENT)
Dept: HEMATOLOGY/ONCOLOGY | Facility: CLINIC | Age: 53
End: 2023-06-14
Payer: MEDICAID

## 2023-06-14 ENCOUNTER — HOSPITAL ENCOUNTER (OUTPATIENT)
Dept: RADIOLOGY | Facility: HOSPITAL | Age: 53
Discharge: HOME | End: 2023-06-14
Attending: SURGERY
Payer: MEDICAID

## 2023-06-14 VITALS
BODY MASS INDEX: 23.95 KG/M2 | HEIGHT: 66 IN | SYSTOLIC BLOOD PRESSURE: 116 MMHG | DIASTOLIC BLOOD PRESSURE: 62 MMHG | WEIGHT: 149 LBS | HEART RATE: 94 BPM

## 2023-06-14 DIAGNOSIS — R63.4 WEIGHT LOSS, UNINTENTIONAL: ICD-10-CM

## 2023-06-14 DIAGNOSIS — Z71.3 NUTRITIONAL COUNSELING: ICD-10-CM

## 2023-06-14 DIAGNOSIS — C50.312 MALIGNANT NEOPLASM OF LOWER-INNER QUADRANT OF LEFT FEMALE BREAST, UNSPECIFIED ESTROGEN RECEPTOR STATUS: ICD-10-CM

## 2023-06-14 DIAGNOSIS — C50.312 MALIGNANT NEOPLASM OF LOWER-INNER QUADRANT OF LEFT FEMALE BREAST, UNSPECIFIED ESTROGEN RECEPTOR STATUS: Primary | ICD-10-CM

## 2023-06-14 DIAGNOSIS — D05.12 DUCTAL CARCINOMA IN SITU (DCIS) OF LEFT BREAST: Primary | ICD-10-CM

## 2023-06-14 DIAGNOSIS — F17.200 CURRENT EVERY DAY SMOKER: ICD-10-CM

## 2023-06-14 DIAGNOSIS — F41.9 ANXIETY: ICD-10-CM

## 2023-06-14 PROCEDURE — 77065 DX MAMMO INCL CAD UNI: CPT | Mod: 26,LT,, | Performed by: RADIOLOGY

## 2023-06-14 PROCEDURE — 1159F PR MEDICATION LIST DOCUMENTED IN MEDICAL RECORD: ICD-10-PCS | Mod: CPTII,,, | Performed by: SURGERY

## 2023-06-14 PROCEDURE — 99211 OFF/OP EST MAY X REQ PHY/QHP: CPT | Mod: PBBFAC,27 | Performed by: STUDENT IN AN ORGANIZED HEALTH CARE EDUCATION/TRAINING PROGRAM

## 2023-06-14 PROCEDURE — 77061 MAMMO DIGITAL DIAGNOSTIC LEFT WITH TOMO: ICD-10-PCS | Mod: 26,LT,, | Performed by: RADIOLOGY

## 2023-06-14 PROCEDURE — 99215 OFFICE O/P EST HI 40 MIN: CPT | Mod: S$PBB,,, | Performed by: STUDENT IN AN ORGANIZED HEALTH CARE EDUCATION/TRAINING PROGRAM

## 2023-06-14 PROCEDURE — 77065 MAMMO DIGITAL DIAGNOSTIC LEFT WITH TOMO: ICD-10-PCS | Mod: 26,LT,, | Performed by: RADIOLOGY

## 2023-06-14 PROCEDURE — 99999 PR PBB SHADOW E&M-EST. PATIENT-LVL III: CPT | Mod: PBBFAC,,, | Performed by: SURGERY

## 2023-06-14 PROCEDURE — 3078F PR MOST RECENT DIASTOLIC BLOOD PRESSURE < 80 MM HG: ICD-10-PCS | Mod: CPTII,,, | Performed by: SURGERY

## 2023-06-14 PROCEDURE — 77061 BREAST TOMOSYNTHESIS UNI: CPT | Mod: 26,LT,, | Performed by: RADIOLOGY

## 2023-06-14 PROCEDURE — 99215 PR OFFICE/OUTPT VISIT, EST, LEVL V, 40-54 MIN: ICD-10-PCS | Mod: S$PBB,,, | Performed by: STUDENT IN AN ORGANIZED HEALTH CARE EDUCATION/TRAINING PROGRAM

## 2023-06-14 PROCEDURE — 99999 PR PBB SHADOW E&M-EST. PATIENT-LVL I: CPT | Mod: PBBFAC,,, | Performed by: STUDENT IN AN ORGANIZED HEALTH CARE EDUCATION/TRAINING PROGRAM

## 2023-06-14 PROCEDURE — 99213 OFFICE O/P EST LOW 20 MIN: CPT | Mod: S$PBB,,, | Performed by: SURGERY

## 2023-06-14 PROCEDURE — 99999 PR PBB SHADOW E&M-EST. PATIENT-LVL I: ICD-10-PCS | Mod: PBBFAC,,, | Performed by: STUDENT IN AN ORGANIZED HEALTH CARE EDUCATION/TRAINING PROGRAM

## 2023-06-14 PROCEDURE — 99213 PR OFFICE/OUTPT VISIT, EST, LEVL III, 20-29 MIN: ICD-10-PCS | Mod: S$PBB,,, | Performed by: SURGERY

## 2023-06-14 PROCEDURE — 3078F DIAST BP <80 MM HG: CPT | Mod: CPTII,,, | Performed by: SURGERY

## 2023-06-14 PROCEDURE — 3074F SYST BP LT 130 MM HG: CPT | Mod: CPTII,,, | Performed by: SURGERY

## 2023-06-14 PROCEDURE — 99999 PR PBB SHADOW E&M-EST. PATIENT-LVL III: ICD-10-PCS | Mod: PBBFAC,,, | Performed by: SURGERY

## 2023-06-14 PROCEDURE — 1160F PR REVIEW ALL MEDS BY PRESCRIBER/CLIN PHARMACIST DOCUMENTED: ICD-10-PCS | Mod: CPTII,,, | Performed by: SURGERY

## 2023-06-14 PROCEDURE — 3074F PR MOST RECENT SYSTOLIC BLOOD PRESSURE < 130 MM HG: ICD-10-PCS | Mod: CPTII,,, | Performed by: SURGERY

## 2023-06-14 PROCEDURE — 1159F MED LIST DOCD IN RCRD: CPT | Mod: CPTII,,, | Performed by: SURGERY

## 2023-06-14 PROCEDURE — 3008F BODY MASS INDEX DOCD: CPT | Mod: CPTII,,, | Performed by: SURGERY

## 2023-06-14 PROCEDURE — 99213 OFFICE O/P EST LOW 20 MIN: CPT | Mod: PBBFAC | Performed by: SURGERY

## 2023-06-14 PROCEDURE — 1160F RVW MEDS BY RX/DR IN RCRD: CPT | Mod: CPTII,,, | Performed by: SURGERY

## 2023-06-14 PROCEDURE — 3008F PR BODY MASS INDEX (BMI) DOCUMENTED: ICD-10-PCS | Mod: CPTII,,, | Performed by: SURGERY

## 2023-06-14 PROCEDURE — 77061 BREAST TOMOSYNTHESIS UNI: CPT | Mod: TC,LT

## 2023-06-14 NOTE — PROGRESS NOTES
[facility]/ [facility]      Chief Complaint: DCIS, HR+     Cancer Staging   No matching staging information was found for the patient.    HPI:  Amina Salazar is a 51yo woman who presents today for evaluation of newly diagnosed DCIS. Her oncologic history is as follows:    -MMG 3/17/23 showing L breast calcifications in lower inner quadrant spanning 5.0 x 4.9cm. Biopsy demonstrating at least DCIS, with surrounding outlines of atypical glandular proliferation, concerning for invasive carcinoma. Unable to determine definitive classification and biomarkers due to poor tissue fixation on sample  -3/29/23 MRI breast showing 45 x 38 x 26mm non-mass enhancement in the lower inner quadrant of the L breast; no suspicious findings in the Rt breast. No concerning LAD  -23 repeat biopsy confirming DCIS, ER %, PA 80%  -s/p genetic testing that was negative    Her medical history is otherwise unremarkable. Family history significant for her mother diagnosed with breast cancer; unsure at what age. Previously working as a  but recently stopped; now stays busy with projects around her house. Previous smoker x 15 years. Stopped for one week but has since started again-currently 1ppd. No history of blood clots.    Gyn History:   Menarche: 12yo  Menopause: perimenopausal     Age at first pregnancy: 15yo  : N  HRT:N    Interval History:  Amina returns today for follow up. Started tamoxifen one week ago and is tolerating well thus far. Had diagnostic MMG this morning. She remains uncertain about surgery. Continues to smoke 1-2 cigarettes daily; explains that she has been afraid to start chantix. Continues to eat healthy and is very focused on maintaining a healthy lifestyle.       Current Outpatient Medications   Medication Instructions    LORazepam (ATIVAN) 1 mg, Oral, See admin instructions, Take 1 hour prior to the MRI exam.  Do not drive when taking this medication. Do not combine with other benzodiazepines.    multivitamin with minerals tablet 1 tablet, Oral, Daily    tamoxifen (NOLVADEX) 20 mg, Oral, Daily       Review of Systems:   +anxiety  12pt ROS negative except as noted above    PHYSICAL EXAM:  There were no vitals taken for this visit.    ECOG 0  General: well appearing, in no apparent distress  HEENT: Normocephalic, EOMI, anicteric sclerae, MMM  Neck: supple, without cervical or supraclavicular lymphadenopathy.  Heart: regular rate and rhythm, normal S1 and S2, no murmurs, gallops or rubs.  Lungs: Clear to auscultation bilaterally, no increased wob  Breast: no appreciable masses, skin changes or nipple abnormality bilaterally. No axillary LAD  Abdomen: Soft, nontender, nondistended with normal bowel sounds. No hepatosplenomegaly.  Extremities: No LE edema or joint effusion  Skin: warm, well-perfused, no rash  Neurologic: Alert and oriented x 4, normal speech and gait   Psychiatric: Conversing appropriately with providers throughout today's encounter.      Pertinent Labs & Imaging:  Personally reviewed all recent labs, imaging and pathology.   Diagnostic MMG 6/14/23: L breast calcifications spanning 6.2 x 3.2 x 4.7cm    Assessment & Plan:  Ms. Salazar is a mar 51yo woman with recently diagnosed DICS who presents today for follow up.    Previously discussed her recent diagnosis and the natural history of DCIS. Reviewed the role of tamoxifen for risk reduction. Reviewed the standard of care for DCIS including surgery +/-radiation and ET. She continues to be hesitant about surgery. Has started tamoxifen 20mg daily and is tolerating well thus far.     Today we reviewed her repeat imaging that is essentially unchanged from prior. Will plan to continue tamoxifen for an additional 3 months. She plans to quit smoking during this time period.     #DCIS:  --cont tamoxifen 20mg daily  --counseled on increased risk and signs/symptoms of  blood clots, particularly given that she continues to smoke  --RTC in 3 months for tolerance check and to proceed with surgical planning, will coordinate appt w/ surg team    #Tobacco use:  --counseled on smoking cessation as above  --encouraged chantix use    #Weight loss: relatively stable from prior visit   --suspect 2/2 recent stress surrounding diagnosis and major dietary changes. TSH previously wnl  --will cont to monitor closely and if weight loss continues, will proceed with additional workup    Reviewed patients referring notes, imaging and pathology. Discussed diagnosis, staging, and treatment in detail with patient. All questions weer answered to her apparent satisfaction. Will plan to see her back in 3mo or sooner should the need arise.    Route Chart for Scheduling    Med Onc Chart Routing      Follow up with physician 3 months. Please coordinate w Elder follow up appt to be seen same day   Follow up with MADDY    Infusion scheduling note    Injection scheduling note    Labs None   Scheduling:  Preferred lab:  Lab interval:     Imaging None      Pharmacy appointment No pharmacy appointment needed      Other referrals no referral to Oncology Primary Care needed -    No additional referrals needed                Total time of this visit, including time spent face to face with patient and/or via video/audio, and also in preparing for today's visit for MDM and documentation. (Medical Decision Making, including consideration of possible diagnoses, management options, complex medical record review, review of diagnostic tests and information, consideration and discussion of significant complications based on comorbidities, and discussion with providers involved with the care of the patient) 40 minutes. Greater than 50% was spent face to face with the patient counseling and coordinating care.    Analy Marina

## 2023-09-27 ENCOUNTER — OFFICE VISIT (OUTPATIENT)
Dept: HEMATOLOGY/ONCOLOGY | Facility: CLINIC | Age: 53
End: 2023-09-27
Payer: MEDICAID

## 2023-09-27 ENCOUNTER — OFFICE VISIT (OUTPATIENT)
Dept: SURGERY | Facility: CLINIC | Age: 53
End: 2023-09-27
Payer: MEDICAID

## 2023-09-27 VITALS
RESPIRATION RATE: 18 BRPM | WEIGHT: 149.13 LBS | BODY MASS INDEX: 23.97 KG/M2 | HEART RATE: 94 BPM | OXYGEN SATURATION: 99 % | HEIGHT: 66 IN | DIASTOLIC BLOOD PRESSURE: 70 MMHG | SYSTOLIC BLOOD PRESSURE: 110 MMHG

## 2023-09-27 VITALS — BODY MASS INDEX: 23.95 KG/M2 | HEIGHT: 66 IN | WEIGHT: 149 LBS

## 2023-09-27 DIAGNOSIS — Z71.3 NUTRITIONAL COUNSELING: ICD-10-CM

## 2023-09-27 DIAGNOSIS — D05.12 DUCTAL CARCINOMA IN SITU (DCIS) OF LEFT BREAST: Primary | ICD-10-CM

## 2023-09-27 DIAGNOSIS — R63.4 WEIGHT LOSS, UNINTENTIONAL: ICD-10-CM

## 2023-09-27 DIAGNOSIS — F17.200 CURRENT EVERY DAY SMOKER: ICD-10-CM

## 2023-09-27 PROCEDURE — 3078F PR MOST RECENT DIASTOLIC BLOOD PRESSURE < 80 MM HG: ICD-10-PCS | Mod: CPTII,,, | Performed by: STUDENT IN AN ORGANIZED HEALTH CARE EDUCATION/TRAINING PROGRAM

## 2023-09-27 PROCEDURE — 99999 PR PBB SHADOW E&M-EST. PATIENT-LVL III: CPT | Mod: PBBFAC,,, | Performed by: STUDENT IN AN ORGANIZED HEALTH CARE EDUCATION/TRAINING PROGRAM

## 2023-09-27 PROCEDURE — 99213 PR OFFICE/OUTPT VISIT, EST, LEVL III, 20-29 MIN: ICD-10-PCS | Mod: S$PBB,,, | Performed by: SURGERY

## 2023-09-27 PROCEDURE — 99213 OFFICE O/P EST LOW 20 MIN: CPT | Mod: S$PBB,,, | Performed by: SURGERY

## 2023-09-27 PROCEDURE — 1160F RVW MEDS BY RX/DR IN RCRD: CPT | Mod: CPTII,,, | Performed by: SURGERY

## 2023-09-27 PROCEDURE — 1159F PR MEDICATION LIST DOCUMENTED IN MEDICAL RECORD: ICD-10-PCS | Mod: CPTII,,, | Performed by: SURGERY

## 2023-09-27 PROCEDURE — 3078F DIAST BP <80 MM HG: CPT | Mod: CPTII,,, | Performed by: STUDENT IN AN ORGANIZED HEALTH CARE EDUCATION/TRAINING PROGRAM

## 2023-09-27 PROCEDURE — 3008F PR BODY MASS INDEX (BMI) DOCUMENTED: ICD-10-PCS | Mod: CPTII,,, | Performed by: STUDENT IN AN ORGANIZED HEALTH CARE EDUCATION/TRAINING PROGRAM

## 2023-09-27 PROCEDURE — 3074F PR MOST RECENT SYSTOLIC BLOOD PRESSURE < 130 MM HG: ICD-10-PCS | Mod: CPTII,,, | Performed by: STUDENT IN AN ORGANIZED HEALTH CARE EDUCATION/TRAINING PROGRAM

## 2023-09-27 PROCEDURE — 3008F BODY MASS INDEX DOCD: CPT | Mod: CPTII,,, | Performed by: SURGERY

## 2023-09-27 PROCEDURE — 99213 OFFICE O/P EST LOW 20 MIN: CPT | Mod: PBBFAC | Performed by: SURGERY

## 2023-09-27 PROCEDURE — 99999 PR PBB SHADOW E&M-EST. PATIENT-LVL III: ICD-10-PCS | Mod: PBBFAC,,, | Performed by: STUDENT IN AN ORGANIZED HEALTH CARE EDUCATION/TRAINING PROGRAM

## 2023-09-27 PROCEDURE — 1160F PR REVIEW ALL MEDS BY PRESCRIBER/CLIN PHARMACIST DOCUMENTED: ICD-10-PCS | Mod: CPTII,,, | Performed by: SURGERY

## 2023-09-27 PROCEDURE — 3008F PR BODY MASS INDEX (BMI) DOCUMENTED: ICD-10-PCS | Mod: CPTII,,, | Performed by: SURGERY

## 2023-09-27 PROCEDURE — 99215 PR OFFICE/OUTPT VISIT, EST, LEVL V, 40-54 MIN: ICD-10-PCS | Mod: S$PBB,,, | Performed by: STUDENT IN AN ORGANIZED HEALTH CARE EDUCATION/TRAINING PROGRAM

## 2023-09-27 PROCEDURE — 99999 PR PBB SHADOW E&M-EST. PATIENT-LVL III: ICD-10-PCS | Mod: PBBFAC,,, | Performed by: SURGERY

## 2023-09-27 PROCEDURE — 99999 PR PBB SHADOW E&M-EST. PATIENT-LVL III: CPT | Mod: PBBFAC,,, | Performed by: SURGERY

## 2023-09-27 PROCEDURE — 99213 OFFICE O/P EST LOW 20 MIN: CPT | Mod: PBBFAC,27 | Performed by: STUDENT IN AN ORGANIZED HEALTH CARE EDUCATION/TRAINING PROGRAM

## 2023-09-27 PROCEDURE — 1159F MED LIST DOCD IN RCRD: CPT | Mod: CPTII,,, | Performed by: SURGERY

## 2023-09-27 PROCEDURE — 3008F BODY MASS INDEX DOCD: CPT | Mod: CPTII,,, | Performed by: STUDENT IN AN ORGANIZED HEALTH CARE EDUCATION/TRAINING PROGRAM

## 2023-09-27 PROCEDURE — 3074F SYST BP LT 130 MM HG: CPT | Mod: CPTII,,, | Performed by: STUDENT IN AN ORGANIZED HEALTH CARE EDUCATION/TRAINING PROGRAM

## 2023-09-27 PROCEDURE — 99215 OFFICE O/P EST HI 40 MIN: CPT | Mod: S$PBB,,, | Performed by: STUDENT IN AN ORGANIZED HEALTH CARE EDUCATION/TRAINING PROGRAM

## 2023-09-27 NOTE — PROGRESS NOTES
Breast Surgery  [facility]  Department of Surgery      REFERRING PROVIDER: No referring provider defined for this encounter.    Chief Complaint: Follow-up (3 mo f/u)      Subjective:      History on initial presentation: Amina Salazar is a 52 y.o. female who presents with left breast Ductal Carcinoma in Situ.     She presented for screening mammogram on 01/24/2023 for yearly scheduled screening. This identified calcifications in the left breast. Follow-up mammogram on 01/30/2023 showed an area of amorphous calcifications in the lower inner quadrant. A stereotactic biopsy was performed on 02/06/2023 with pathology revealing ductal carcinoma in-situ of the breast.     The patient's initial imaging was performed at outside institution.  Our radiologist reviewed her imaging and found the area of calcifications to be slightly larger.  We measured approximately 5 x 4.9 cm of calcifications in the lower inner quadrant.    Pathology from an outside institution showed high-grade DCIS with mask with concern for possible invasion.  Hormone receptor testing was not able to be performed.    She was seen for evaluation for surgery and reconstruction at the outside I facility.  She was recommended quit smoking but has not been able to.  She reports that she does not believe that she has breast cancer and is inclined to proceed with no interventions.    Findings at that time were the following:   Lesion 1:    Location:  Left, lower inner quadrant   Clip:  No post biopsy mammogram available for review  Tumor size:  5 cm   Tumor thgthrthathdtheth:th th4th Estrogen Receptor:  Unknown   Progesterone Receptor:  Unknown     Patient has not noted a change on breast exam.  Patient denies nipple discharge. Patient denies previous breast biopsy. Patient denies personal history of breast cancer. Family history includes mother with breast cancer at 50, cousin with breast cancer at 50, maternal uncle bone cancer at 55..     GYN History:  Age of  menarche was 13.  Possibly perimenopausal only recently stopped birth control shots.  Patient denies hormonal therapy. Patient is . Age of first live birth was 15. Patient did not breast feed.    Interval history 23:  Since her last visit, she had undergone an MRI on 3/29 which showed 45 mm x 38 mm x 26 mm heterogeneous, non-mass enhancement in a segmental distribution seen in the lower inner quadrant of the left breast, 4.3 cm from the nipple and 0.6 cm from the chest wall in the left breast which corresponded to prior calcs seen on mammogram. She then underwent a stereotatic guided biopsy on 23 which showed DCIS. Initially, there was a second biopsy planned for that date for calcifications more posteriorly (closer to the original biopsy site) but she was unable to tolerate this. After the biopsy, she did not wish to undergo surgery but was considering her options, however she was amenable to endocrine therapy and started on tamoxifen  on 5/3/23. At this time, she continues to smoke.     Location:  Left, lower inner quadrant   Clip:  The X shaped marker placed today is located 3.2 cm anterior to the T shaped marker (top ) placed at the outside institution but within the lesion.  Tumor size:  4.5 cm   Tumor ndgndrndanddndend:nd nd2nd Estrogen Receptor:  positive  Progesterone Receptor:  positive    Interval history 2023:  No new breast related complaints. No changes in medical history. Has not quit smoking.       Past Medical History:   Diagnosis Date    Breast cancer      Past Surgical History:   Procedure Laterality Date    BREAST BIOPSY Left 2023     SECTION       Current Outpatient Medications on File Prior to Visit   Medication Sig Dispense Refill    multivitamin with minerals tablet Take 1 tablet by mouth once daily.      tamoxifen (NOLVADEX) 20 MG Tab Take 1 tablet (20 mg total) by mouth once daily. 90 tablet 3    LORazepam (ATIVAN) 1 MG tablet Take 1 tablet (1 mg total) by mouth As  "instructed for Anxiety. Take 1 hour prior to the MRI exam. Do not drive when taking this medication. Do not combine with other benzodiazepines. (Patient not taking: Reported on 9/27/2023) 1 tablet 0     No current facility-administered medications on file prior to visit.     Social History     Socioeconomic History    Marital status: Single   Tobacco Use    Smoking status: Every Day     Current packs/day: 1.00     Types: Cigarettes    Smokeless tobacco: Never   Substance and Sexual Activity    Alcohol use: No    Drug use: No     History reviewed. No pertinent family history.     Review of Systems   All other systems reviewed and are negative.    Objective:   Ht 5' 6" (1.676 m)   Wt 67.6 kg (149 lb)   BMI 24.05 kg/m²     Physical Exam   Vitals reviewed.  Constitutional: She is oriented to person, place, and time.   HENT:   Head: Normocephalic and atraumatic.   Cardiovascular:  Normal rate.            No murmur heard.  Pulmonary/Chest: Effort normal. No respiratory distress. Right breast exhibits no inverted nipple, no mass, no nipple discharge, no skin change and no tenderness. Left breast exhibits no inverted nipple, no mass, no nipple discharge, no skin change and no tenderness.   Abdominal: Normal appearance.   Musculoskeletal: Lymphadenopathy:      Cervical: No cervical adenopathy.      Upper Body:      Right upper body: No supraclavicular or axillary adenopathy.      Left upper body: No supraclavicular or axillary adenopathy.     Neurological: She is alert and oriented to person, place, and time.   Skin: Skin is warm and dry.     Psychiatric: Her behavior is normal. Mood, judgment and thought content normal.       Radiology review: Images personally reviewed by me in the clinic and shown to the patient during the consultation.     6/14/23  History:  Patient is 52 y.o. and is seen for diagnostic imaging. Left breast DCIS. On Tamoxifen. Patient opts for medical treatment at this time.      Films Compared:  Prior " images (if available) were compared.     Findings:  This procedure was performed using tomosynthesis. Computer-aided detection was utilized in the interpretation of this examination.  The left breast has scattered areas of fibroglandular density.     There are 45 mm x 48 mm amorphous calcifications in a segmental distribution seen in the lower inner quadrant of the left breast. Compared to the previous study, there are no significant changes given differences in positioning. There is a tophat marker posteriorly and X marker anteriorly within the calcifications, both sites showing DCIS.      Impression:  Left  Calcifications: Left breast 45 mm x 48 mm calcifications at the lower inner position, consistent with known DCIS. No significant change. Assessment: 6 - Known biopsy, proven malignancy.      BI-RADS Category:   Overall: 6 - Known Biopsy-Proven Malignancy        Recommendation:  Continued clinical management of known DCIS. Patient declines surgical intervention at this time. She will be due for annual imaging in January.    Assessment:       1. Ductal carcinoma in situ (DCIS) of left breast            Plan:   We discussed that standard of care would be to proceed with surgery. She desires to avoid surgery but may consider in the future. Currently taking Tamoxifen. Tolerating well. She reports her main barrier to surgery is the desire not to be cut on. She would consider surgery if cancer worsening. She understands the risk of this approach in having the cancer progress. She understands that surgery is recommended for a curative approach to treatment.       We discussed the options for breast reconstruction the setting of mastectomy.  We discussed that plastic surgeons usually do not offer reconstruction if the patient is a current nicotine user.  The patient is currently smoking and has failed attempts to quit.     She met with medical oncology today for follow up.     She does meet NCCN guidelines for genetic  testing based on her family history. Genetic testing  was negative.    Imaging in 6 months. Will meet again at that time for breast exam and to assess willingness for surgery.     20 minutes were spent on this encounter, 15 of which was face to face counseling and 5 minutes were spent on chart review and coordination of care.

## 2023-09-27 NOTE — PROGRESS NOTES
[facility]/ [facility]      Chief Complaint: DCIS, HR+     Cancer Staging   No matching staging information was found for the patient.    HPI:  Amina Salazar is a 51yo woman who presents today for evaluation of newly diagnosed DCIS. Her oncologic history is as follows:    -MMG 3/17/23 showing L breast calcifications in lower inner quadrant spanning 5.0 x 4.9cm. Biopsy demonstrating at least DCIS, with surrounding outlines of atypical glandular proliferation, concerning for invasive carcinoma. Unable to determine definitive classification and biomarkers due to poor tissue fixation on sample  -3/29/23 MRI breast showing 45 x 38 x 26mm non-mass enhancement in the lower inner quadrant of the L breast; no suspicious findings in the Rt breast. No concerning LAD  -23 repeat biopsy confirming DCIS, ER %, PA 80%  -s/p genetic testing that was negative    Her medical history is otherwise unremarkable. Family history significant for her mother diagnosed with breast cancer; unsure at what age. Previously working as a  but recently stopped; now stays busy with projects around her house. Previous smoker x 15 years. Stopped for one week but has since started again-currently 1ppd. No history of blood clots.    Gyn History:   Menarche: 14yo  Menopause: perimenopausal     Age at first pregnancy: 15yo  : N  HRT:N    Interval History:  Amina returns today for follow up. Started tamoxifen 3 months ago and is tolerating well thus far. Reports limited hot flashes.     Current Outpatient Medications   Medication Instructions    LORazepam (ATIVAN) 1 mg, Oral, See admin instructions, Take 1 hour prior to the MRI exam. Do not drive when taking this medication. Do not combine with other benzodiazepines.    multivitamin with minerals tablet 1 tablet, Oral, Daily    tamoxifen (NOLVADEX) 20 mg, Oral, Daily       Review of Systems:  "  +anxiety, hot flashes  12pt ROS negative except as noted above    PHYSICAL EXAM:  /70   Pulse 94   Resp 18   Ht 5' 6" (1.676 m)   Wt 67.7 kg (149 lb 2.3 oz)   SpO2 99%   BMI 24.07 kg/m²     ECOG 0  General: well appearing, in no apparent distress  HEENT: Normocephalic, EOMI, anicteric sclerae, MMM  Neck: supple, without cervical or supraclavicular lymphadenopathy.  Heart: regular rate and rhythm, normal S1 and S2, no murmurs, gallops or rubs.  Lungs: Clear to auscultation bilaterally, no increased wob  Breast: no appreciable masses, skin changes or nipple abnormality bilaterally. No axillary LAD  Abdomen: Soft, nontender, nondistended with normal bowel sounds. No hepatosplenomegaly.  Extremities: No LE edema or joint effusion  Skin: warm, well-perfused, no rash  Neurologic: Alert and oriented x 4, normal speech and gait   Psychiatric: Conversing appropriately with providers throughout today's encounter.      Pertinent Labs & Imaging:  Personally reviewed all recent labs, imaging and pathology.   Diagnostic MMG 6/14/23: L breast calcifications spanning 6.2 x 3.2 x 4.7cm    Assessment & Plan:  Ms. Salazar is a mar 53yo woman with recently diagnosed DCIS who presents today for follow up.    Previously discussed her recent diagnosis and the natural history of DCIS. Reviewed the role of tamoxifen for risk reduction. Reviewed the standard of care for DCIS including surgery +/-radiation and ET. She continues to be hesitant about surgery. Has started tamoxifen 20mg daily and is tolerating well thus far.     Will plan to continue tamoxifen.    #DCIS:  --cont tamoxifen 20mg daily  --counseled on increased risk and signs/symptoms of blood clots, particularly given that she continues to smoke  --RTC in 3 months for tolerance check, will consider repeat imaging at that time     #Tobacco use:  --counseled on smoking cessation as above  --encouraged chantix use    #Weight loss: relatively stable from prior visit "   --suspect 2/2 recent stress surrounding diagnosis and major dietary changes. TSH previously wnl  --will cont to monitor closely and if weight loss continues, will proceed with additional workup        Route Chart for Scheduling    Med Onc Chart Routing      Follow up with physician 3 months.   Follow up with MADDY    Infusion scheduling note    Injection scheduling note    Labs    Imaging    Pharmacy appointment    Other referrals                       I personally interviewed and examined this patient today with Dr. Bergeron and agree with the assessment and plan set forth in her note. She remains hesitant about surgery and would prefer to continue tamoxifen at this time. Will see her back in 3mo w imaging.     Analy Marina MD

## 2023-12-27 ENCOUNTER — HOSPITAL ENCOUNTER (OUTPATIENT)
Dept: RADIOLOGY | Facility: HOSPITAL | Age: 53
Discharge: HOME | End: 2023-12-27
Attending: SURGERY
Payer: MEDICAID

## 2023-12-27 ENCOUNTER — OFFICE VISIT (OUTPATIENT)
Dept: SURGERY | Facility: CLINIC | Age: 53
End: 2023-12-27
Payer: MEDICAID

## 2023-12-27 VITALS — WEIGHT: 149 LBS | BODY MASS INDEX: 23.95 KG/M2 | HEIGHT: 66 IN

## 2023-12-27 VITALS
SYSTOLIC BLOOD PRESSURE: 119 MMHG | HEART RATE: 80 BPM | WEIGHT: 149 LBS | DIASTOLIC BLOOD PRESSURE: 79 MMHG | BODY MASS INDEX: 23.95 KG/M2 | OXYGEN SATURATION: 98 % | HEIGHT: 66 IN

## 2023-12-27 DIAGNOSIS — D05.12 DUCTAL CARCINOMA IN SITU (DCIS) OF LEFT BREAST: Primary | ICD-10-CM

## 2023-12-27 DIAGNOSIS — D05.12 DUCTAL CARCINOMA IN SITU (DCIS) OF LEFT BREAST: ICD-10-CM

## 2023-12-27 PROCEDURE — 77066 MAMMO DIGITAL DIAGNOSTIC BILAT WITH TOMO: ICD-10-PCS | Mod: 26,,, | Performed by: RADIOLOGY

## 2023-12-27 PROCEDURE — 77062 BREAST TOMOSYNTHESIS BI: CPT | Mod: 26,,, | Performed by: RADIOLOGY

## 2023-12-27 PROCEDURE — 3074F SYST BP LT 130 MM HG: CPT | Mod: CPTII,,, | Performed by: SURGERY

## 2023-12-27 PROCEDURE — 1160F RVW MEDS BY RX/DR IN RCRD: CPT | Mod: CPTII,,, | Performed by: SURGERY

## 2023-12-27 PROCEDURE — 99213 OFFICE O/P EST LOW 20 MIN: CPT | Mod: PBBFAC | Performed by: SURGERY

## 2023-12-27 PROCEDURE — 77066 DX MAMMO INCL CAD BI: CPT | Mod: 26,,, | Performed by: RADIOLOGY

## 2023-12-27 PROCEDURE — 99999 PR PBB SHADOW E&M-EST. PATIENT-LVL III: CPT | Mod: PBBFAC,,, | Performed by: SURGERY

## 2023-12-27 PROCEDURE — 3008F BODY MASS INDEX DOCD: CPT | Mod: CPTII,,, | Performed by: SURGERY

## 2023-12-27 PROCEDURE — 77062 MAMMO DIGITAL DIAGNOSTIC BILAT WITH TOMO: ICD-10-PCS | Mod: 26,,, | Performed by: RADIOLOGY

## 2023-12-27 PROCEDURE — 77066 DX MAMMO INCL CAD BI: CPT | Mod: TC

## 2023-12-27 PROCEDURE — 3078F DIAST BP <80 MM HG: CPT | Mod: CPTII,,, | Performed by: SURGERY

## 2023-12-27 PROCEDURE — 99213 OFFICE O/P EST LOW 20 MIN: CPT | Mod: S$PBB,,, | Performed by: SURGERY

## 2023-12-27 PROCEDURE — 1159F MED LIST DOCD IN RCRD: CPT | Mod: CPTII,,, | Performed by: SURGERY

## 2023-12-27 NOTE — PROGRESS NOTES
[facility]/ [facility]      Chief Complaint: DCIS, HR+     Cancer Staging   No matching staging information was found for the patient.    HPI:  Amina Salazar is a 53yo woman who presents today for evaluation of newly diagnosed DCIS. Her oncologic history is as follows:    -MMG 3/17/23 showing L breast calcifications in lower inner quadrant spanning 5.0 x 4.9cm. Biopsy demonstrating at least DCIS, with surrounding outlines of atypical glandular proliferation, concerning for invasive carcinoma. Unable to determine definitive classification and biomarkers due to poor tissue fixation on sample  -3/29/23 MRI breast showing 45 x 38 x 26mm non-mass enhancement in the lower inner quadrant of the L breast; no suspicious findings in the Rt breast. No concerning LAD  -23 repeat biopsy confirming DCIS, ER %, NC 80%  -s/p genetic testing that was negative    Her medical history is otherwise unremarkable. Family history significant for her mother diagnosed with breast cancer; unsure at what age. Previously working as a  but recently stopped; now stays busy with projects around her house. Previous smoker x 15 years. Stopped for one week but has since started again-currently 1ppd. No history of blood clots.    Gyn History:   Menarche: 12yo  Menopause: perimenopausal     Age at first pregnancy: 13yo  : N  HRT:N    Interval History:  Amina returns today for follow up. Continues to tolerate tamoxifen fairly well. Notes ongoing hot flashes which are bothersome to her at night. Also has had difficulty with a fungal infection over the skin between her breasts. Explains that she is still not interested in pursuing surgery.       Current Outpatient Medications   Medication Instructions    multivitamin with minerals tablet 1 tablet, Oral, Daily    tamoxifen (NOLVADEX) 20 mg, Oral, Daily       Review of Systems:   +hot flashes  12pt  "ROS negative except as noted above    PHYSICAL EXAM:  /73   Pulse 93   Temp 97 °F (36.1 °C) (Oral)   Resp 17   Ht 5' 6" (1.676 m)   Wt 68.6 kg (151 lb 3.8 oz)   SpO2 99%   BMI 24.41 kg/m²     ECOG 0  General: well appearing, in no apparent distress  HEENT: Normocephalic, EOMI, anicteric sclerae, MMM  Neck: supple, without cervical or supraclavicular lymphadenopathy.  Heart: regular rate and rhythm, normal S1 and S2, no murmurs, gallops or rubs.  Lungs: Clear to auscultation bilaterally, no increased wob  Breast: no appreciable masses, skin changes or nipple abnormality bilaterally. No axillary LAD. Small area of skin irritation with erythema noted between breasts  Abdomen: Soft, nontender, nondistended with normal bowel sounds. No hepatosplenomegaly.  Extremities: No LE edema or joint effusion  Skin: warm, well-perfused, no rash  Neurologic: Alert and oriented x 4, normal speech and gait   Psychiatric: Conversing appropriately with providers throughout today's encounter.      Pertinent Labs & Imaging:  Personally reviewed all recent labs, imaging and pathology.     Diagnostic MMG 12/27/23: L breast calcifications spanning 62 x 32mm, unchanged from prior imaging.    Assessment & Plan:  Ms. Salazar is a mar 51yo woman with recently diagnosed DCIS who presents today for follow up.    Previously discussed her recent diagnosis and the natural history of DCIS. Reviewed the role of tamoxifen for risk reduction. We also reviewed the standard of care for DCIS including surgery +/- radiation and ET. She continues to be hesitant about surgery. Started tamoxifen 20mg daily and is tolerating well thus far.     Will plan to continue tamoxifen.    #DCIS:  --cont tamoxifen 20mg daily, SOT 5/2023--  --counseled on increased risk and signs/symptoms of blood clots, particularly given that she continues to smoke  --RTC in 6mo w repeat diagnostic imaging (scheduled 6/5/24)    #Tobacco use:  --counseled on smoking cessation " as above    #Skin irritation:  --will order trial of ketoconazole cream; she will be in touch if this does not improve    #Hot flashes:  --discussed trial of magnesium glycinate  --will also place referral to int onc for acpunture    #Weight loss: wt stable from prior visit and actually increased from 6mo ago.  --discussed that smoking cessation may help with preventing wt loss  --will cont to monitor    All questions were answered to her apparent satisfaction. Will see her back in 6mo or sooner should the need arise.     Route Chart for Scheduling    Med Onc Chart Routing      Follow up with physician 6 months. scheduled   Follow up with MADDY    Infusion scheduling note    Injection scheduling note    Labs None   Scheduling:  Preferred lab:  Lab interval:     Imaging Mammogram   diagnostic MMG scheduled in June   Pharmacy appointment No pharmacy appointment needed      Other referrals no referral to Oncology Primary Care needed -  no Massage appointment needed    Additional referrals needed  integrative onc                  MDM includes  :    - Acute or chronic illness or injury that poses a threat to life or bodily function  - Review of prior external notes from unique source  - Independent review and explanation of 3+ results from unique tests  - Discussion of management and ordering 3+ unique tests  - Extensive discussion of treatment and management  - Prescription drug management  - Drug therapy requiring intensive monitoring for toxicity    Analy Marina MD

## 2023-12-28 ENCOUNTER — OFFICE VISIT (OUTPATIENT)
Dept: HEMATOLOGY/ONCOLOGY | Facility: CLINIC | Age: 53
End: 2023-12-28
Payer: MEDICAID

## 2023-12-28 VITALS
SYSTOLIC BLOOD PRESSURE: 121 MMHG | OXYGEN SATURATION: 99 % | TEMPERATURE: 97 F | RESPIRATION RATE: 17 BRPM | WEIGHT: 151.25 LBS | DIASTOLIC BLOOD PRESSURE: 73 MMHG | BODY MASS INDEX: 24.31 KG/M2 | HEART RATE: 93 BPM | HEIGHT: 66 IN

## 2023-12-28 DIAGNOSIS — D05.12 DUCTAL CARCINOMA IN SITU (DCIS) OF LEFT BREAST: Primary | ICD-10-CM

## 2023-12-28 DIAGNOSIS — R23.8 SKIN IRRITATION: ICD-10-CM

## 2023-12-28 DIAGNOSIS — Z71.3 NUTRITIONAL COUNSELING: ICD-10-CM

## 2023-12-28 DIAGNOSIS — F17.200 CURRENT EVERY DAY SMOKER: ICD-10-CM

## 2023-12-28 DIAGNOSIS — R23.2 HOT FLASHES DUE TO TAMOXIFEN: ICD-10-CM

## 2023-12-28 DIAGNOSIS — T45.1X5A HOT FLASHES DUE TO TAMOXIFEN: ICD-10-CM

## 2023-12-28 DIAGNOSIS — R63.4 WEIGHT LOSS, UNINTENTIONAL: ICD-10-CM

## 2023-12-28 PROCEDURE — 3074F SYST BP LT 130 MM HG: CPT | Mod: CPTII,,, | Performed by: STUDENT IN AN ORGANIZED HEALTH CARE EDUCATION/TRAINING PROGRAM

## 2023-12-28 PROCEDURE — 3074F PR MOST RECENT SYSTOLIC BLOOD PRESSURE < 130 MM HG: ICD-10-PCS | Mod: CPTII,,, | Performed by: STUDENT IN AN ORGANIZED HEALTH CARE EDUCATION/TRAINING PROGRAM

## 2023-12-28 PROCEDURE — 99999 PR PBB SHADOW E&M-EST. PATIENT-LVL III: ICD-10-PCS | Mod: PBBFAC,,, | Performed by: STUDENT IN AN ORGANIZED HEALTH CARE EDUCATION/TRAINING PROGRAM

## 2023-12-28 PROCEDURE — 99215 PR OFFICE/OUTPT VISIT, EST, LEVL V, 40-54 MIN: ICD-10-PCS | Mod: S$PBB,,, | Performed by: STUDENT IN AN ORGANIZED HEALTH CARE EDUCATION/TRAINING PROGRAM

## 2023-12-28 PROCEDURE — 1159F PR MEDICATION LIST DOCUMENTED IN MEDICAL RECORD: ICD-10-PCS | Mod: CPTII,,, | Performed by: STUDENT IN AN ORGANIZED HEALTH CARE EDUCATION/TRAINING PROGRAM

## 2023-12-28 PROCEDURE — 3078F PR MOST RECENT DIASTOLIC BLOOD PRESSURE < 80 MM HG: ICD-10-PCS | Mod: CPTII,,, | Performed by: STUDENT IN AN ORGANIZED HEALTH CARE EDUCATION/TRAINING PROGRAM

## 2023-12-28 PROCEDURE — 99213 OFFICE O/P EST LOW 20 MIN: CPT | Mod: PBBFAC | Performed by: STUDENT IN AN ORGANIZED HEALTH CARE EDUCATION/TRAINING PROGRAM

## 2023-12-28 PROCEDURE — 3008F BODY MASS INDEX DOCD: CPT | Mod: CPTII,,, | Performed by: STUDENT IN AN ORGANIZED HEALTH CARE EDUCATION/TRAINING PROGRAM

## 2023-12-28 PROCEDURE — 99999 PR PBB SHADOW E&M-EST. PATIENT-LVL III: CPT | Mod: PBBFAC,,, | Performed by: STUDENT IN AN ORGANIZED HEALTH CARE EDUCATION/TRAINING PROGRAM

## 2023-12-28 PROCEDURE — 99215 OFFICE O/P EST HI 40 MIN: CPT | Mod: S$PBB,,, | Performed by: STUDENT IN AN ORGANIZED HEALTH CARE EDUCATION/TRAINING PROGRAM

## 2023-12-28 PROCEDURE — 1159F MED LIST DOCD IN RCRD: CPT | Mod: CPTII,,, | Performed by: STUDENT IN AN ORGANIZED HEALTH CARE EDUCATION/TRAINING PROGRAM

## 2023-12-28 PROCEDURE — 3078F DIAST BP <80 MM HG: CPT | Mod: CPTII,,, | Performed by: STUDENT IN AN ORGANIZED HEALTH CARE EDUCATION/TRAINING PROGRAM

## 2023-12-28 PROCEDURE — 3008F PR BODY MASS INDEX (BMI) DOCUMENTED: ICD-10-PCS | Mod: CPTII,,, | Performed by: STUDENT IN AN ORGANIZED HEALTH CARE EDUCATION/TRAINING PROGRAM

## 2023-12-28 RX ORDER — KETOCONAZOLE 20 MG/G
CREAM TOPICAL DAILY
Qty: 30 G | Refills: 1 | Status: SHIPPED | OUTPATIENT
Start: 2023-12-28

## 2024-01-02 ENCOUNTER — TELEPHONE (OUTPATIENT)
Dept: HEMATOLOGY/ONCOLOGY | Facility: CLINIC | Age: 54
End: 2024-01-02
Payer: MEDICAID

## 2024-01-02 NOTE — PROGRESS NOTES
Breast Surgery  [facility]  Department of Surgery      REFERRING PROVIDER: No referring provider defined for this encounter.    Chief Complaint: Follow-up (Follow up MMG & Surgery Discussion .)      Subjective:      History on initial presentation: Amina Salazar is a 53 y.o. female who presents with left breast Ductal Carcinoma in Situ.     She presented for screening mammogram on 01/24/2023 for yearly scheduled screening. This identified calcifications in the left breast. Follow-up mammogram on 01/30/2023 showed an area of amorphous calcifications in the lower inner quadrant. A stereotactic biopsy was performed on 02/06/2023 with pathology revealing ductal carcinoma in-situ of the breast.     The patient's initial imaging was performed at outside institution.  Our radiologist reviewed her imaging and found the area of calcifications to be slightly larger.  We measured approximately 5 x 4.9 cm of calcifications in the lower inner quadrant.    Pathology from an outside institution showed high-grade DCIS with mask with concern for possible invasion.  Hormone receptor testing was not able to be performed.    She was seen for evaluation for surgery and reconstruction at the outside I facility.  She was recommended quit smoking but has not been able to.  She reports that she does not believe that she has breast cancer and is inclined to proceed with no interventions.    Findings at that time were the following:   Lesion 1:    Location:  Left, lower inner quadrant   Clip:  No post biopsy mammogram available for review  Tumor size:  5 cm   Tumor thgthrthathdtheth:th th4th Estrogen Receptor:  Unknown   Progesterone Receptor:  Unknown     Patient has not noted a change on breast exam.  Patient denies nipple discharge. Patient denies previous breast biopsy. Patient denies personal history of breast cancer. Family history includes mother with breast cancer at 50, cousin with breast cancer at 50, maternal uncle bone cancer at 55..      GYN History:  Age of menarche was 13.  Possibly perimenopausal only recently stopped birth control shots.  Patient denies hormonal therapy. Patient is . Age of first live birth was 15. Patient did not breast feed.    Interval history 23:  Since her last visit, she had undergone an MRI on 3/29 which showed 45 mm x 38 mm x 26 mm heterogeneous, non-mass enhancement in a segmental distribution seen in the lower inner quadrant of the left breast, 4.3 cm from the nipple and 0.6 cm from the chest wall in the left breast which corresponded to prior calcs seen on mammogram. She then underwent a stereotatic guided biopsy on 23 which showed DCIS. Initially, there was a second biopsy planned for that date for calcifications more posteriorly (closer to the original biopsy site) but she was unable to tolerate this. After the biopsy, she did not wish to undergo surgery but was considering her options, however she was amenable to endocrine therapy and started on tamoxifen  on 5/3/23. At this time, she continues to smoke.     Location:  Left, lower inner quadrant   Clip:  The X shaped marker placed today is located 3.2 cm anterior to the T shaped marker (top ) placed at the outside institution but within the lesion.  Tumor size:  4.5 cm   Tumor thgthrthathdtheth:th th4th Estrogen Receptor:  positive  Progesterone Receptor:  positive    Interval history 2023:  No new breast related complaints. No changes in medical history. Has not quit smoking.     Interval history 2023:   No new breast related complaints.  No changes in medical history.  Was worked up for her weight loss with no underlying cause identified.  Has been unable to quit smoking.      Past Medical History:   Diagnosis Date    Breast cancer      Past Surgical History:   Procedure Laterality Date    BREAST BIOPSY Left 2023     SECTION       Current Outpatient Medications on File Prior to Visit   Medication Sig Dispense Refill     "multivitamin with minerals tablet Take 1 tablet by mouth once daily.      tamoxifen (NOLVADEX) 20 MG Tab Take 1 tablet (20 mg total) by mouth once daily. 90 tablet 3     No current facility-administered medications on file prior to visit.     Social History     Socioeconomic History    Marital status: Single   Tobacco Use    Smoking status: Every Day     Current packs/day: 1.00     Types: Cigarettes    Smokeless tobacco: Never   Substance and Sexual Activity    Alcohol use: No    Drug use: No     History reviewed. No pertinent family history.     Review of Systems   All other systems reviewed and are negative.    Objective:   /79 (BP Location: Right arm, Patient Position: Sitting, BP Method: Large (Automatic))   Pulse 80   Ht 5' 6" (1.676 m)   Wt 67.6 kg (149 lb)   SpO2 98%   BMI 24.05 kg/m²     Physical Exam   Vitals reviewed.  Constitutional: She is oriented to person, place, and time.   HENT:   Head: Normocephalic and atraumatic.   Cardiovascular:  Normal rate.            No murmur heard.  Pulmonary/Chest: Effort normal. No respiratory distress. Right breast exhibits no inverted nipple, no mass, no nipple discharge, no skin change and no tenderness. Left breast exhibits no inverted nipple, no mass, no nipple discharge, no skin change and no tenderness.   Abdominal: Normal appearance.   Musculoskeletal: Lymphadenopathy:      Cervical: No cervical adenopathy.      Upper Body:      Right upper body: No supraclavicular or axillary adenopathy.      Left upper body: No supraclavicular or axillary adenopathy.     Neurological: She is alert and oriented to person, place, and time.   Skin: Skin is warm and dry.     Psychiatric: Her behavior is normal. Mood, judgment and thought content normal.       Radiology review: Images personally reviewed by me in the clinic and shown to the patient during the consultation.    Result:   Mammo Digital Diagnostic Bilat with [place]     History:  Patient is 53 y.o. and is seen " for diagnostic imaging. Left breast DCIS. On Tamoxifen. Patient currently declines surgery.     Films Compared:  Prior images (if available) were compared.     Findings:  This procedure was performed using tomosynthesis. Computer-aided detection was utilized in the interpretation of this examination.  The breasts have scattered areas of fibroglandular density.      Left  There are 62 mm x 32 mm amorphous calcifications in a segmental distribution seen in the lower inner quadrant of the left breast. Calcifications are best measured on CC view today due to intolerance of compression on the spot ML view. Compared to the previous studies (magnification views on 04/21/2023) there are no significant changes. Calcifications are status post biopsy showing DCIS.     No new suspicious mass, calcifications or other abnormality to suggest malignancy.      Right  There is no evidence of suspicious masses, calcifications, or other abnormal findings in the right breast.     Impression:  Left  Calcifications: Left breast 62 mm x 32 mm calcifications at the lower inner position, no significant change. Assessment: 6 - Known biopsy, proven malignancy. Surgical Consult is recommended.      Right  There is no mammographic evidence of malignancy in the right breast.     BI-RADS Category:   Overall: 6 - Known Biopsy-Proven Malignancy        Recommendation:  Clinical management of known left breast cancer. Patient is established with the breast surgery and oncology clinics.        Assessment:       1. Ductal carcinoma in situ (DCIS) of left breast            Plan:   We once again discussed that standard of care would be to proceed with surgery. She desires to avoid surgery but may consider in the future. Currently taking Tamoxifen. Tolerating well. She would consider surgery if cancer worsening. She understands the risk of this approach in having the cancer progress. She understands that surgery is recommended for a curative approach to  treatment.     She we will meet with Medical Oncology tomorrow to discuss follow up.    She does meet NCCN guidelines for genetic testing based on her family history. Genetic testing  was negative.    Imaging in 6 months. Will meet again at that time for breast exam and to assess willingness for surgery.     20 minutes were spent on this encounter, 15 of which was face to face counseling and 5 minutes were spent on chart review and coordination of care.

## 2024-01-02 NOTE — TELEPHONE ENCOUNTER
Spoke to pt. in regards to referral to Integrative Oncology placed by Dr. Analy Marina. Pt. approved and confirmed scheduling for a virtual visit 1/3/2024 @ 1PM with Joanna Dubinsky, PA-C. Pt. confirmed. MA advised pt. That this is an introductory visit whereby the provider will review pt's overall health and discuss  all services offered by [facility]. MA offered guidance on e-pre check-in (instructed pt. to check-in on day of appt. in order to see green button at the bottom of page (which will illuminate green 10-mins. before the visit) for the easiest check-in. Pt achknowledged.    [place], [place] Ext [phone]

## 2024-01-03 ENCOUNTER — OFFICE VISIT (OUTPATIENT)
Dept: HEMATOLOGY/ONCOLOGY | Facility: CLINIC | Age: 54
End: 2024-01-03
Payer: MEDICAID

## 2024-01-03 ENCOUNTER — TELEPHONE (OUTPATIENT)
Dept: HEMATOLOGY/ONCOLOGY | Facility: CLINIC | Age: 54
End: 2024-01-03

## 2024-01-03 DIAGNOSIS — F17.200 CURRENT EVERY DAY SMOKER: ICD-10-CM

## 2024-01-03 DIAGNOSIS — D05.12 DUCTAL CARCINOMA IN SITU (DCIS) OF LEFT BREAST: Primary | ICD-10-CM

## 2024-01-03 DIAGNOSIS — M54.59 OTHER LOW BACK PAIN: ICD-10-CM

## 2024-01-03 PROCEDURE — 99214 OFFICE O/P EST MOD 30 MIN: CPT | Mod: 95,,, | Performed by: PHYSICIAN ASSISTANT

## 2024-01-03 NOTE — PROGRESS NOTES
The patient location is: at [place], [place]      Visit type: audiovisual    Face to Face time with patient: 20  15 minutes of total time spent on the encounter, which includes face to face time and non-face to face time preparing to see the patient (eg, review of tests), Obtaining and/or reviewing separately obtained history, Documenting clinical information in the electronic or other health record, Independently interpreting results (not separately reported) and communicating results to the patient/family/caregiver, or Care coordination (not separately reported).         Each patient to whom he or she provides medical services by telemedicine is:  (1) informed of the relationship between the physician and patient and the respective role of any other health care provider with respect to management of the patient; and (2) notified that he or she may decline to receive medical services by telemedicine and may withdraw from such care at any time.    Notes:              Integrative [facility] and Medicine Initial Visit      Chief Complaint:  I want to promote my overall well-being through cancer.    HPI: Patient arrives to [facility] today with a history of left breast DCIS initially diagnosed at [facility] via bx 2/6/23. Pathology report demonstrated concerns for IDC with no tumor markers. She has declined surgery offered since earlier this year, now under the care of Dr. Marina, who referred her.  Dr. Marina recently reviewed diagnosis and the natural history of DCIS. Reviewed the role of tamoxifen for risk reduction. She explained the standard of care for DCIS including surgery +/- radiation and ET. She continues to be hesitant about surgery but knows this is the recommended treatment. Continues tamoxifen 20mg daily and is tolerating well thus far except with hot flashes.  She is currently working as a  and is calling in her uniform today. Patient is most concerned about hot flashes and her weight  "loss. Acupuncture previously denied, will resubmit but advised her generally medicaid will not cover this service. She did do a smoking cessation program at [facility]- quit cold turkey for 1 week but started up due to stress. She has 17 grandchildren that she wants to live for.  She does not regularly exercise but walks a lot during the course of her work day.  She states she used to exercise at home and even had little weights she would use.  She has not picked up the magnesium glycinate recommended for sleep.  She struggles with sleep both falling and staying asleep. She feels good about her diet; she met with the Integrative Nutritionist early in her journey.  She eats lean meat including fish and chicken.           Cancer/Stage/TNM:    Cancer Staging   No matching staging information was found for the patient.     Oncology History    No history exists.         Past Medical History:   Diagnosis Date    Breast cancer         Current Outpatient Medications   Medication Instructions    ketoconazole (NIZORAL) 2 % cream Topical (Top), Daily    multivitamin with minerals tablet 1 tablet, Oral, Daily    tamoxifen (NOLVADEX) 20 mg, Oral, Daily        Past Surgical History:   Procedure Laterality Date    BREAST BIOPSY Left 2023     SECTION            7 Pillars Assessment      Sleep  How many hours of sleep per night? 6-7 hours  Do you have trouble falling asleep, staying asleep or waking up earlier than you need to? yes  Do you have daytime fatigue? no  Do you need medication for sleep? no  Do you use any supplements or other interventions for sleep? no    Resilience  Rate your current level of stress- low  How do you manage stress?  "I try to block it out"    Purpose  Do you feel you have a vision or a life purpose? Yes    Environment  Any exposures:no known exposures    Spirituality-  Hindu    Nutrition   Food allergies or sensitivities: no  Do you adhere to a particular type of diet? no  What type of diet do " you follow? Low sugar  Do you have any concerns with your eating habits? no  Are you concerned with your level of alcohol intake? no    Exercise  How would you describe your physical activity level? Low/mod  Do you work at a sedentary job? no  What do you do for physical activity? Walk       Physical Exam   There were no vitals taken for this visit.   Wt Readings from Last 3 Encounters:   12/28/23 68.6 kg (151 lb 3.8 oz)   12/27/23 67.6 kg (149 lb)   12/27/23 67.6 kg (149 lb)     Temp Readings from Last 3 Encounters:   12/28/23 97 °F (36.1 °C) (Oral)   04/11/23 98.3 °F (36.8 °C) (Oral)   03/20/23 98.6 °F (37 °C) (Oral)     BP Readings from Last 3 Encounters:   12/28/23 121/73   12/27/23 119/79   09/27/23 110/70     Pulse Readings from Last 3 Encounters:   12/28/23 93   12/27/23 80   09/27/23 94       Body mass index is There is no height or weight on file to calculate BMI.    Vitals reviewed.   Constitutional:       General: Patient is not in acute distress.     Appearance: Normal appearance.   HENT:      Head: Normocephalic and atraumatic.   Cardiovascular:      Rate and Rhythm: Normal rate   Pulmonary:      Effort: Pulmonary effort is normal.       Review of Systems:   Cardiac:           No SOB, chest pain with exertion,edema, orthopnea  Distress:          No excessive sadness, no hopelessness, no anhedonia, no excessive worry or nervousness  Cognitive:        No trouble with memory, no difficulty paying attention, no brain fog, no trouble functioning with work or home life  Fatigue:           Energy level adequate, performing ADL's, no morning fatigue                           Fatigue  1  / 10  ( Scale 0 - 10)   Hormonal:       +hot flashes/night sweats  Pain:                Has pain,  location: occ low back pain                          Pain 0   / 10 (Scale 0 - 10)    Neuropathy:    No numbness, no tingling, no paresthesia   Sleep:              No difficulty falling asleep, no waking up in night, no daytime  sleepiness, no snoring  Altered function:          No problems with money management,  no problems with daily organization & planning  Weight:           concerned about weight loss, wants to gain       Labs:   Lab Results   Component Value Date    WBC 7.6 12/12/2023    HGB 13.5 12/12/2023    HCT 39.4 12/12/2023    MCV 95.5 12/12/2023     03/20/2023           Hemoglobin A1C   Date Value Ref Range Status   06/01/2022 5.7 (H) 4.7 - 5.6 % Final   11/19/2020 5.8 (H) 4.7 - 5.6 % Final            Assessment:   Patient is a 53 y.o.female who arrived to Integrative Oncology consult due to cancer diagnosis.        Plan   #Nutrition: Encourage plant-forward anti-inflammatory diet with plenty of protein to maintain muscle mass, patient has met with Nutrition and re-referral offered  # Sleep: Recommend 6-8 hours of restful sleep nightly; recommend strong sleep hygiene routine one hour prior to bed,  the magnesium glycinate previously recommended, can try 400 mg nightly, also recommend trying melatonin 1-3 mg nightly  # Exercise: Recommend 60 minutes of gentle movement/light activity per day (cleaning, walking, cooking, gardening, stationary bike).  Also recommend 150 minutes of moderate intensity cardio weekly, which could include brisk walking.  Also encourage some sort of resistance training to maintain muscle mass.  Could consider PT  # Acupuncture: for help with smoking cessation and hot flashes; not generally covered by medicaid  # OT: for stress and hot flashes, encourage this both if acupuncture is covered and if it isn't to help with hot flashes  # Smoking cessation:  we discussed that quitting smoking would be the best thing patient could do for her health, would help reduce hot flashes and would likely allow her to gain back some weight she lost.  Patient acknowledges this and is willing to work with the Smoking Cessation Program here   # Psychology for anxiety, to help with sleep, to work through issues  around diagnosis especially her concern of weight loss and fear of surgery   #Follow-Up: PRN

## 2024-01-03 NOTE — TELEPHONE ENCOUNTER
Spoke with pt to schedule can psych appt per Joanna Dubinsky, PA-C recommendation. Pt approved to schedule in-person visit for 1/22/2024 @ 8AM with Dr. Mendenhall. MA was unable to override prompt that given during scheduling and reach out to Dr. Mendenhall. Dr. Mendenhall advised to reach out to [place]. MA reached out to [facility] To have pt schedule.  MA informed pt that MA will reach back out if anything changes with the appt. Pt acknowledged.     [place], [place] Ext [phone]

## 2024-01-22 ENCOUNTER — TELEPHONE (OUTPATIENT)
Dept: HEMATOLOGY/ONCOLOGY | Facility: CLINIC | Age: 54
End: 2024-01-22
Payer: MEDICAID

## 2024-01-22 NOTE — TELEPHONE ENCOUNTER
Spoke w/ pt in regards to appt with Dr. Mendenhall today for 8AM. Pt apologized for not being able to make it to today's appt. MA offer to reschedule. Pt approved for r/s on Monday January 29th @ 1PM. MA offer earlier dates/time. Pt denied bc she is only off on Sundays & Mondays. MA offer to send out letter reminder of appt, pt approved.       MAXIMO MAYER Ext [phone]

## 2024-01-24 ENCOUNTER — CLINICAL SUPPORT (OUTPATIENT)
Dept: SMOKING CESSATION | Facility: CLINIC | Age: 54
End: 2024-01-24
Payer: COMMERCIAL

## 2024-01-24 DIAGNOSIS — F17.200 NICOTINE DEPENDENCE: Primary | ICD-10-CM

## 2024-01-24 PROCEDURE — 99999 PR PBB SHADOW E&M-EST. PATIENT-LVL I: CPT | Mod: PBBFAC,,,

## 2024-01-24 RX ORDER — DM/P-EPHED/ACETAMINOPH/DOXYLAM 30-7.5/3
2 LIQUID (ML) ORAL
Qty: 72 LOZENGE | Refills: 0 | Status: SHIPPED | OUTPATIENT
Start: 2024-01-24

## 2024-01-24 NOTE — PROGRESS NOTES
Patient was seen in clinic today and reports smoking up to 10 cigarettes per day. Patient will begin biweekly tobacco cessation cessations and a tobacco cessation medication regimen of 2mg nicotine lozenges. Patient informed CTTS that her physician insisted that she not use the patch at this time. Patient was given Chantix by her physician but states that she is nervous to begin after reading the side effects. Discussed tips and strategies to assist with quit. The patient will continue with  therapy sessions and medication monitoring by CTTS. Prescribed medication management will be by physician.

## 2024-01-29 ENCOUNTER — TELEPHONE (OUTPATIENT)
Dept: HEMATOLOGY/ONCOLOGY | Facility: CLINIC | Age: 54
End: 2024-01-29
Payer: MEDICAID

## 2024-01-29 NOTE — TELEPHONE ENCOUNTER
LVM in regards to no show appt w/ Dr. Mendenhall today 1/29/2024. MA instructed pt. in voicemail to call the office number to reschedule appt.         MAXIMO MAYER ext [phone]

## 2024-02-07 ENCOUNTER — TELEPHONE (OUTPATIENT)
Dept: SMOKING CESSATION | Facility: CLINIC | Age: 54
End: 2024-02-07
Payer: MEDICAID

## 2024-02-07 ENCOUNTER — CLINICAL SUPPORT (OUTPATIENT)
Dept: SMOKING CESSATION | Facility: CLINIC | Age: 54
End: 2024-02-07
Payer: COMMERCIAL

## 2024-02-07 DIAGNOSIS — F17.200 NICOTINE DEPENDENCE: Primary | ICD-10-CM

## 2024-02-07 PROCEDURE — 99999 PR PBB SHADOW E&M-EST. PATIENT-LVL I: CPT | Mod: PBBFAC,,,

## 2024-02-07 PROCEDURE — 99406 BEHAV CHNG SMOKING 3-10 MIN: CPT | Mod: S$GLB,,,

## 2024-02-07 NOTE — TELEPHONE ENCOUNTER
Called patient to follow-up on cancelled appointment. Left voicemail with CTTS contact information for patient to return call.

## 2024-02-08 NOTE — PROGRESS NOTES
Patient returned call to CTTS and stated that she had successfully gone 3 days without cigarettes but then would smoke a half pack on the 4th day. Patient reports that this has been a cycle. She states that she was unable to get the lozenges because the pharmacy told her that the lozenges are on back-order and unavailable to order at this time. CTTS will contact insurance company to see what tobacco cessation medication is covered by the patient's insurance. Encouraged patient to continue trying to go days without cigarettes and set hard boundaries of no more than 5 cigarettes. The patient will continue with  therapy sessions and medication monitoring by CTTS. Prescribed medication management will be by physician.

## 2024-02-26 ENCOUNTER — TELEPHONE (OUTPATIENT)
Dept: SMOKING CESSATION | Facility: CLINIC | Age: 54
End: 2024-02-26
Payer: MEDICAID

## 2024-05-03 DIAGNOSIS — D05.12 DUCTAL CARCINOMA IN SITU (DCIS) OF LEFT BREAST: ICD-10-CM

## 2024-05-03 RX ORDER — TAMOXIFEN CITRATE 20 MG/1
20 TABLET ORAL
Qty: 90 TABLET | Refills: 3 | Status: SHIPPED | OUTPATIENT
Start: 2024-05-03 | End: 2024-06-05 | Stop reason: SDUPTHER

## 2024-06-05 ENCOUNTER — OFFICE VISIT (OUTPATIENT)
Dept: SURGERY | Facility: CLINIC | Age: 54
End: 2024-06-05
Payer: MEDICAID

## 2024-06-05 ENCOUNTER — HOSPITAL ENCOUNTER (OUTPATIENT)
Dept: RADIOLOGY | Facility: HOSPITAL | Age: 54
Discharge: HOME | End: 2024-06-05
Attending: SURGERY
Payer: MEDICAID

## 2024-06-05 ENCOUNTER — OFFICE VISIT (OUTPATIENT)
Dept: HEMATOLOGY/ONCOLOGY | Facility: CLINIC | Age: 54
End: 2024-06-05
Payer: MEDICAID

## 2024-06-05 ENCOUNTER — TELEPHONE (OUTPATIENT)
Dept: SMOKING CESSATION | Facility: CLINIC | Age: 54
End: 2024-06-05
Payer: MEDICAID

## 2024-06-05 VITALS
HEART RATE: 91 BPM | BODY MASS INDEX: 24.77 KG/M2 | TEMPERATURE: 97 F | DIASTOLIC BLOOD PRESSURE: 62 MMHG | WEIGHT: 153.44 LBS | SYSTOLIC BLOOD PRESSURE: 112 MMHG | OXYGEN SATURATION: 98 %

## 2024-06-05 DIAGNOSIS — D05.12 DUCTAL CARCINOMA IN SITU (DCIS) OF LEFT BREAST: ICD-10-CM

## 2024-06-05 DIAGNOSIS — D05.12 DUCTAL CARCINOMA IN SITU (DCIS) OF LEFT BREAST: Primary | ICD-10-CM

## 2024-06-05 DIAGNOSIS — F17.200 CURRENT EVERY DAY SMOKER: Primary | ICD-10-CM

## 2024-06-05 PROCEDURE — 77065 DX MAMMO INCL CAD UNI: CPT | Mod: TC,LT

## 2024-06-05 PROCEDURE — 3008F BODY MASS INDEX DOCD: CPT | Mod: CPTII,,, | Performed by: STUDENT IN AN ORGANIZED HEALTH CARE EDUCATION/TRAINING PROGRAM

## 2024-06-05 PROCEDURE — 99213 OFFICE O/P EST LOW 20 MIN: CPT | Mod: PBBFAC | Performed by: STUDENT IN AN ORGANIZED HEALTH CARE EDUCATION/TRAINING PROGRAM

## 2024-06-05 PROCEDURE — 99214 OFFICE O/P EST MOD 30 MIN: CPT | Mod: S$PBB,,, | Performed by: SURGERY

## 2024-06-05 PROCEDURE — 77061 BREAST TOMOSYNTHESIS UNI: CPT | Mod: 26,LT,, | Performed by: RADIOLOGY

## 2024-06-05 PROCEDURE — 99999 PR PBB SHADOW E&M-EST. PATIENT-LVL I: CPT | Mod: PBBFAC,,, | Performed by: SURGERY

## 2024-06-05 PROCEDURE — 99211 OFF/OP EST MAY X REQ PHY/QHP: CPT | Mod: PBBFAC,27 | Performed by: SURGERY

## 2024-06-05 PROCEDURE — 99215 OFFICE O/P EST HI 40 MIN: CPT | Mod: S$PBB,,, | Performed by: STUDENT IN AN ORGANIZED HEALTH CARE EDUCATION/TRAINING PROGRAM

## 2024-06-05 PROCEDURE — 77065 DX MAMMO INCL CAD UNI: CPT | Mod: 26,LT,, | Performed by: RADIOLOGY

## 2024-06-05 PROCEDURE — 3044F HG A1C LEVEL LT 7.0%: CPT | Mod: CPTII,,, | Performed by: SURGERY

## 2024-06-05 PROCEDURE — 3078F DIAST BP <80 MM HG: CPT | Mod: CPTII,,, | Performed by: STUDENT IN AN ORGANIZED HEALTH CARE EDUCATION/TRAINING PROGRAM

## 2024-06-05 PROCEDURE — 3074F SYST BP LT 130 MM HG: CPT | Mod: CPTII,,, | Performed by: STUDENT IN AN ORGANIZED HEALTH CARE EDUCATION/TRAINING PROGRAM

## 2024-06-05 PROCEDURE — 99999 PR PBB SHADOW E&M-EST. PATIENT-LVL III: CPT | Mod: PBBFAC,,, | Performed by: STUDENT IN AN ORGANIZED HEALTH CARE EDUCATION/TRAINING PROGRAM

## 2024-06-05 PROCEDURE — 1159F MED LIST DOCD IN RCRD: CPT | Mod: CPTII,,, | Performed by: SURGERY

## 2024-06-05 PROCEDURE — 1160F RVW MEDS BY RX/DR IN RCRD: CPT | Mod: CPTII,,, | Performed by: SURGERY

## 2024-06-05 PROCEDURE — 77061 BREAST TOMOSYNTHESIS UNI: CPT | Mod: TC,LT

## 2024-06-05 PROCEDURE — 3044F HG A1C LEVEL LT 7.0%: CPT | Mod: CPTII,,, | Performed by: STUDENT IN AN ORGANIZED HEALTH CARE EDUCATION/TRAINING PROGRAM

## 2024-06-05 PROCEDURE — G2211 COMPLEX E/M VISIT ADD ON: HCPCS | Mod: S$PBB,,, | Performed by: STUDENT IN AN ORGANIZED HEALTH CARE EDUCATION/TRAINING PROGRAM

## 2024-06-05 RX ORDER — TAMOXIFEN CITRATE 20 MG/1
20 TABLET ORAL DAILY
Qty: 90 TABLET | Refills: 3 | Status: SHIPPED | OUTPATIENT
Start: 2024-06-05

## 2024-06-05 NOTE — PROGRESS NOTES
Breast Surgery  [facility]  Department of Surgery      REFERRING PROVIDER: No referring provider defined for this encounter.    Chief Complaint: DCIS      Subjective:      History on initial presentation: Amina Salazar is a 53 y.o. female who presents with left breast Ductal Carcinoma in Situ.     She presented for screening mammogram on 01/24/2023 for yearly scheduled screening. This identified calcifications in the left breast. Follow-up mammogram on 01/30/2023 showed an area of amorphous calcifications in the lower inner quadrant. A stereotactic biopsy was performed on 02/06/2023 with pathology revealing ductal carcinoma in-situ of the breast.     The patient's initial imaging was performed at outside institution.  Our radiologist reviewed her imaging and found the area of calcifications to be slightly larger.  We measured approximately 5 x 4.9 cm of calcifications in the lower inner quadrant.    Pathology from an outside institution showed high-grade DCIS with mask with concern for possible invasion.  Hormone receptor testing was not able to be performed.    She was seen for evaluation for surgery and reconstruction at the outside I facility.  She was recommended quit smoking but has not been able to.  She reports that she does not believe that she has breast cancer and is inclined to proceed with no interventions.    Findings at that time were the following:   Lesion 1:    Location:  Left, lower inner quadrant   Clip:  No post biopsy mammogram available for review  Tumor size:  5 cm   Tumor thgthrthathdtheth:th th4th Estrogen Receptor:  Unknown   Progesterone Receptor:  Unknown     Patient has not noted a change on breast exam.  Patient denies nipple discharge. Patient denies previous breast biopsy. Patient denies personal history of breast cancer. Family history includes mother with breast cancer at 50, cousin with breast cancer at 50, maternal uncle bone cancer at 55..     GYN History:  Age of menarche was 13.   Possibly perimenopausal only recently stopped birth control shots.  Patient denies hormonal therapy. Patient is . Age of first live birth was 15. Patient did not breast feed.    Interval history 23:  Since her last visit, she had undergone an MRI on 3/29 which showed 45 mm x 38 mm x 26 mm heterogeneous, non-mass enhancement in a segmental distribution seen in the lower inner quadrant of the left breast, 4.3 cm from the nipple and 0.6 cm from the chest wall in the left breast which corresponded to prior calcs seen on mammogram. She then underwent a stereotatic guided biopsy on 23 which showed DCIS. Initially, there was a second biopsy planned for that date for calcifications more posteriorly (closer to the original biopsy site) but she was unable to tolerate this. After the biopsy, she did not wish to undergo surgery but was considering her options, however she was amenable to endocrine therapy and started on tamoxifen  on 5/3/23. At this time, she continues to smoke.     Location:  Left, lower inner quadrant   Clip:  The X shaped marker placed today is located 3.2 cm anterior to the T shaped marker (top ) placed at the outside institution but within the lesion.  Tumor size:  4.5 cm   Tumor thgthrthathdtheth:th th4th Estrogen Receptor:  positive  Progesterone Receptor:  positive    Interval history 2023:  No new breast related complaints. No changes in medical history. Has not quit smoking.     Interval history 2023:   No new breast related complaints.  No changes in medical history.  Was worked up for her weight loss with no underlying cause identified.  Has been unable to quit smoking.    Interval history 24:   No new breast symptoms. No changes to medical history. No new changes or findings on repeat mammogram. Has not been able to quit smoking, although reports trying.       Past Medical History:   Diagnosis Date    Breast cancer      Past Surgical History:   Procedure Laterality Date     BREAST BIOPSY Left 2023     SECTION       Current Outpatient Medications on File Prior to Visit   Medication Sig Dispense Refill    ketoconazole (NIZORAL) 2 % cream Apply topically once daily. 30 g 1    multivitamin with minerals tablet Take 1 tablet by mouth once daily.      nicotine polacrilex 2 MG Lozg Take 1 lozenge (2 mg total) by mouth as needed (in place of cigarette). 72 lozenge 0    tamoxifen (NOLVADEX) 20 MG Tab Take 1 tablet (20 mg total) by mouth once daily. 90 tablet 3     No current facility-administered medications on file prior to visit.     Social History     Socioeconomic History    Marital status: Single   Tobacco Use    Smoking status: Every Day     Current packs/day: 1.00     Types: Cigarettes    Smokeless tobacco: Never   Substance and Sexual Activity    Alcohol use: No    Drug use: No     Social Determinants of Health     Financial Resource Strain: Low Risk  (1/3/2024)    Overall Financial Resource Strain (CARDIA)     Difficulty of Paying Living Expenses: Not very hard   Food Insecurity: Food Insecurity Present (1/3/2024)    Hunger Vital Sign     Worried About Running Out of Food in the Last Year: Sometimes true     Ran Out of Food in the Last Year: Patient declined   Transportation Needs: Unmet Transportation Needs (1/3/2024)    PRAPARE - Transportation     Lack of Transportation (Medical): Yes     Lack of Transportation (Non-Medical): Yes   Physical Activity: Sufficiently Active (1/3/2024)    Exercise Vital Sign     Days of Exercise per Week: 5 days     Minutes of Exercise per Session: 30 min   Stress: No Stress Concern Present (1/3/2024)    [employer] - Occupational Stress Questionnaire     Feeling of Stress : Only a little   Housing Stability: Unknown (1/3/2024)    Housing Stability Vital Sign     Unable to Pay for Housing in the Last Year: No     Unstable Housing in the Last Year: No     No family history on file.     Review of Systems   All other  systems reviewed and are negative.    Objective:   There were no vitals taken for this visit.    Physical Exam   Vitals reviewed.  Constitutional: She is oriented to person, place, and time.   HENT:   Head: Normocephalic and atraumatic.   Cardiovascular:  Normal rate.            Pulmonary/Chest: Effort normal. No respiratory distress. Right breast exhibits no inverted nipple, no mass, no nipple discharge, no skin change and no tenderness. Left breast exhibits no inverted nipple, no mass, no nipple discharge, no skin change and no tenderness.   Abdominal: Normal appearance.   Musculoskeletal: Lymphadenopathy:      Cervical: No cervical adenopathy.      Upper Body:      Right upper body: No supraclavicular or axillary adenopathy.      Left upper body: No supraclavicular or axillary adenopathy.     Neurological: She is alert and oriented to person, place, and time.   Skin: Skin is warm and dry.     Psychiatric: Her behavior is normal. Mood, judgment and thought content normal.       Radiology review: Images personally reviewed by me in the clinic and shown to the patient during the consultation.    Films Compared:  Compared to: 12/27/2023 Mammo Digital Diagnostic Bilat with [place], 06/14/2023 Mammo Digital Diagnostic Left with [place], and 04/21/2023 Mammo Breast Stereotactic Biopsy Left     Findings:  This procedure was performed using tomosynthesis. Computer-aided detection was utilized in the interpretation of this examination.  The left breast has scattered areas of fibroglandular density.  There are 52 mm x 23 mm amorphous calcifications in a segmental distribution seen in the lower inner quadrant of the left breast.       I attribute the decrease in the measurements to positioning. Magnification views were performed. Several images were obtained. Definitive calcifications can only be seen involving the area described above on this exam.      Impression:  Left  Calcifications: Left breast 62 mm x 32 mm calcifications  at the lower inner position. Assessment: 6 - Known biopsy, proven malignancy. The patient has elected not to undergo surgical treatment for this ductal carcinoma in-situ. She is under the care of breast surgery.      BI-RADS Category:   Overall: 6 - Known Biopsy-Proven Malignancy      Recommendation:  Clinical management of known left breast cancer. Patient is established with the breast surgery and oncology clinics.        Assessment:       1. Ductal carcinoma in situ (DCIS) of left breast        Plan:   We once again discussed that standard of care would be to proceed with surgery. She continues to desire to avoid surgery at this time. Currently taking Tamoxifen. Tolerating well. She would consider surgery if cancer worsening. Based on imaging and clinical exam today, the cancer does not appear to be worsening. She understands the risk of this approach in having the cancer progress. She understands that surgery is recommended for a curative approach to treatment.     She met with Medical Oncology today for follow up endocrine therapy. She will continue to follow with them routinely.     She does meet NCCN guidelines for genetic testing based on her family history. Genetic testing was negative.    Imaging in 6 months. Will be meeting with medical oncology at this time and they will let us know if she would like to pursue surgical treatment.  She as been instructed to follow up with us PRN/if she decides she would like to pursue surgical intervention.     30 minutes were spent on this encounter, 20 of which was face to face counseling and 10 minutes were spent on chart review and coordination of care.

## 2024-06-05 NOTE — TELEPHONE ENCOUNTER
Returned call to patient who states that she would like to schedule an intake on Tuesday, 6/11 @ 8:00am

## 2024-06-05 NOTE — PROGRESS NOTES
[facility]/ [facility]      Chief Complaint: DCIS, HR+     Cancer Staging   No matching staging information was found for the patient.      HPI:  Amina Salazar is a 51yo woman who presents today for evaluation of newly diagnosed DCIS. Her oncologic history is as follows:    -MMG 3/17/23 showing L breast calcifications in lower inner quadrant spanning 5.0 x 4.9cm. Biopsy demonstrating at least DCIS, with surrounding outlines of atypical glandular proliferation, concerning for invasive carcinoma. Unable to determine definitive classification and biomarkers due to poor tissue fixation on sample  -3/29/23 MRI breast showing 45 x 38 x 26mm non-mass enhancement in the lower inner quadrant of the L breast; no suspicious findings in the Rt breast. No concerning LAD  -23 repeat biopsy confirming DCIS, ER %, WI 80%  -s/p genetic testing that was negative    Her medical history is otherwise unremarkable. Family history significant for her mother diagnosed with breast cancer; unsure at what age. Previously working as a  but recently stopped; now stays busy with projects around her house. Previous smoker x 15 years. Stopped for one week but has since started again-currently 1ppd. No history of blood clots.    Gyn History:   Menarche: 14yo  Menopause: perimenopausal     Age at first pregnancy: 13yo  : N  HRT:N    Interval History:  Amina returns today for follow up. Continues to tolerate tamoxifen fairly well with only intermittent hot flashes. Likely unrelated but she reports some persistent gas with constipation at this visit and was encouraged to start a daily Miralax and assess response. She has been out of tamoxifen for 1 week prior to this visit and denies any noticeable changes in her self breast exams. Of note, she continues to struggle with tobacco cessation and is interested in getting established with our  cessation program. We discussed the importance of this in setting of tamoxifen use. Extensive counseling done regarding our recommendation for surgery with her wanting to consider at this time.     Current Outpatient Medications   Medication Instructions    ketoconazole (NIZORAL) 2 % cream Topical (Top), Daily    multivitamin with minerals tablet 1 tablet, Oral, Daily    nicotine polacrilex 2 mg, Oral, As needed (PRN)    tamoxifen (NOLVADEX) 20 mg, Oral, Daily       Review of Systems:   +hot flashes  12pt ROS negative except as noted above    PHYSICAL EXAM:  /62 (BP Location: Left arm, Patient Position: Sitting, BP Method: Medium (Automatic))   Pulse 91   Temp 97 °F (36.1 °C) (Oral)   Wt 69.6 kg (153 lb 7 oz)   SpO2 98%   BMI 24.77 kg/m²     ECOG 0  General: well appearing, in no apparent distress  HEENT: Normocephalic, EOMI, anicteric sclerae, MMM  Neck: supple, without cervical or supraclavicular lymphadenopathy.  Heart: regular rate and rhythm, normal S1 and S2, no murmurs, gallops or rubs.  Lungs: Clear to auscultation bilaterally, no increased wob  Breast: no appreciable masses, skin changes or nipple abnormality bilaterally. No axillary LAD. Small area of skin irritation with erythema noted between breasts  Abdomen: Soft, nontender, nondistended with normal bowel sounds. No hepatosplenomegaly.  Extremities: No LE edema or joint effusion  Skin: warm, well-perfused, no rash  Neurologic: Alert and oriented x 4, normal speech and gait   Psychiatric: Conversing appropriately with providers throughout today's encounter.      Pertinent Labs & Imaging:  Personally reviewed all recent labs, imaging and pathology.     Bilat Diagnostic MMG 12/27/23: L breast calcifications spanning 62 x 32mm, unchanged from prior imaging.    L breast diagnostic MMG 6/5/24: L breast calcs measuring 52 x 23mm; decrease in measurement attributed to positioning.       Assessment & Plan:  Ms. Salazar is a mar 54yo woman with  recently diagnosed DCIS who presents today for follow up.    Previously discussed her recent diagnosis and the natural history of DCIS. Reviewed the role of tamoxifen for risk reduction. We also reviewed the standard of care for DCIS including surgery +/- radiation and ET. She continues to be hesitant about surgery at this visit. She continues tamoxifen 20mg daily and is tolerating well thus far.     #DCIS:  --Continue Tamoxifen daily at this time (SOT 5/2023), adjust based on willingness to undergo standard of care surgery  --Educated extensively regarding need for surgery and risk for progression to invasive carcinoma even despite tamoxifen therapy and was agreeable and understanding  --If continued refusal for surgery, would require indefinite tamoxifen therapy  --counseled on increased risk and signs/symptoms of blood clots, particularly given that she continues to smoke  --RTC in 6mo w repeat diagnostic imaging 12/2024    #Tobacco use:  --counseled on smoking cessation as above  --Referral sent to tobacco cessation program    #Skin irritation:  --Follow-up with PCP and Derm as needed    #Hot flashes:  --Minimal reported at this time  --Can offer medical management if persistent at next visit    #Weight loss:   --discussed that smoking cessation may help with preventing wt loss  --will cont to monitor    All questions were answered to her apparent satisfaction. Will see her back in 6mo or sooner should the need arise.     Route Chart for Scheduling    Med Onc Chart Routing      Follow up with physician 6 months. 6 months, Dr. Marina   Follow up with MADDY    Infusion scheduling note    Injection scheduling note    Labs    Imaging Mammogram   Mammogram in 6 months prior to visit   Pharmacy appointment    Other referrals       Additional referrals needed  Tobacco cessation clinic              MDM includes  :    - Acute or chronic illness or injury that poses a threat to life or bodily function  - Review of prior  external notes from unique source  - Independent review and explanation of 3+ results from unique tests  - Discussion of management and ordering 3+ unique tests  - Extensive discussion of treatment and management  - Prescription drug management  - Drug therapy requiring intensive monitoring for toxicity    I personally interviewed and examined this patient today with Dr. Mendoza and agree with the assessment and plan set forth in his note. Ms. Salazar remains hesitant to proceed with surgery and wishes to cont tamoxifen at this time. She understands that SOC and curative treatment would require surgery. Tolerating tamoxifen without difficulty and imaging remains stable at this time. Counseled on smoking cessation as above. RTC in 6mo w diagnostic MMG.     Analy Marina MD

## 2024-06-11 ENCOUNTER — TELEPHONE (OUTPATIENT)
Dept: SMOKING CESSATION | Facility: CLINIC | Age: 54
End: 2024-06-11
Payer: MEDICAID

## 2024-06-11 NOTE — TELEPHONE ENCOUNTER
Called patient to follow-up on missed appointment. Patient reports that she loss consciousness yesterday and was taken to the emergency room. She reports having chest pain and thinks that she is having a stroke, suggested that patient go back to the hospital. Patient aware of CTTS contact information and states that she will reach out to reschedule.

## 2024-06-12 ENCOUNTER — HOSPITAL ENCOUNTER (EMERGENCY)
Facility: OTHER | Age: 54
Discharge: HOME | End: 2024-06-12
Attending: EMERGENCY MEDICINE
Payer: MEDICAID

## 2024-06-12 VITALS
DIASTOLIC BLOOD PRESSURE: 67 MMHG | BODY MASS INDEX: 24.37 KG/M2 | WEIGHT: 151 LBS | OXYGEN SATURATION: 97 % | HEART RATE: 68 BPM | RESPIRATION RATE: 16 BRPM | SYSTOLIC BLOOD PRESSURE: 141 MMHG | TEMPERATURE: 98 F

## 2024-06-12 DIAGNOSIS — R07.9 CHEST PAIN: ICD-10-CM

## 2024-06-12 DIAGNOSIS — R07.89 RIGHT-SIDED CHEST WALL PAIN: Primary | ICD-10-CM

## 2024-06-12 LAB
ALBUMIN SERPL BCP-MCNC: 3.3 G/DL (ref 3.5–5.2)
ALBUMIN SERPL BCP-MCNC: 3.3 G/DL (ref 3.5–5.2)
ALP SERPL-CCNC: 62 U/L (ref 55–135)
ALP SERPL-CCNC: 62 U/L (ref 55–135)
ALT SERPL W/O P-5'-P-CCNC: 13 U/L (ref 10–44)
ALT SERPL W/O P-5'-P-CCNC: 13 U/L (ref 10–44)
ANION GAP SERPL CALC-SCNC: 8 MMOL/L (ref 8–16)
ANION GAP SERPL CALC-SCNC: 8 MMOL/L (ref 8–16)
AST SERPL-CCNC: 20 U/L (ref 10–40)
AST SERPL-CCNC: 20 U/L (ref 10–40)
BASOPHILS # BLD AUTO: 0.05 K/UL (ref 0–0.2)
BASOPHILS NFR BLD: 0.7 % (ref 0–1.9)
BILIRUB SERPL-MCNC: 0.2 MG/DL (ref 0.1–1)
BILIRUB SERPL-MCNC: 0.2 MG/DL (ref 0.1–1)
BNP SERPL-MCNC: 119 PG/ML (ref 0–99)
BUN SERPL-MCNC: 14 MG/DL (ref 6–20)
BUN SERPL-MCNC: 14 MG/DL (ref 6–20)
CALCIUM SERPL-MCNC: 9.2 MG/DL (ref 8.7–10.5)
CALCIUM SERPL-MCNC: 9.2 MG/DL (ref 8.7–10.5)
CHLORIDE SERPL-SCNC: 109 MMOL/L (ref 95–110)
CHLORIDE SERPL-SCNC: 109 MMOL/L (ref 95–110)
CO2 SERPL-SCNC: 25 MMOL/L (ref 23–29)
CO2 SERPL-SCNC: 25 MMOL/L (ref 23–29)
CREAT SERPL-MCNC: 0.9 MG/DL (ref 0.5–1.4)
CREAT SERPL-MCNC: 0.9 MG/DL (ref 0.5–1.4)
CREAT SERPL-MCNC: 1 MG/DL (ref 0.5–1.4)
DIFFERENTIAL METHOD BLD: ABNORMAL
EOSINOPHIL # BLD AUTO: 0.2 K/UL (ref 0–0.5)
EOSINOPHIL NFR BLD: 2.8 % (ref 0–8)
ERYTHROCYTE [DISTWIDTH] IN BLOOD BY AUTOMATED COUNT: 13.1 % (ref 11.5–14.5)
EST. GFR  (NO RACE VARIABLE): >60 ML/MIN/1.73 M^2
EST. GFR  (NO RACE VARIABLE): >60 ML/MIN/1.73 M^2
GLUCOSE SERPL-MCNC: 85 MG/DL (ref 70–110)
GLUCOSE SERPL-MCNC: 85 MG/DL (ref 70–110)
HCT VFR BLD AUTO: 37.1 % (ref 37–48.5)
HGB BLD-MCNC: 12.1 G/DL (ref 12–16)
IMM GRANULOCYTES # BLD AUTO: 0.01 K/UL (ref 0–0.04)
IMM GRANULOCYTES NFR BLD AUTO: 0.1 % (ref 0–0.5)
LYMPHOCYTES # BLD AUTO: 2.9 K/UL (ref 1–4.8)
LYMPHOCYTES NFR BLD: 39.6 % (ref 18–48)
MCH RBC QN AUTO: 31.6 PG (ref 27–31)
MCHC RBC AUTO-ENTMCNC: 32.6 G/DL (ref 32–36)
MCV RBC AUTO: 97 FL (ref 82–98)
MONOCYTES # BLD AUTO: 0.6 K/UL (ref 0.3–1)
MONOCYTES NFR BLD: 8.4 % (ref 4–15)
NEUTROPHILS # BLD AUTO: 3.6 K/UL (ref 1.8–7.7)
NEUTROPHILS NFR BLD: 48.4 % (ref 38–73)
NRBC BLD-RTO: 0 /100 WBC
PLATELET # BLD AUTO: 218 K/UL (ref 150–450)
PMV BLD AUTO: 11.2 FL (ref 9.2–12.9)
POTASSIUM SERPL-SCNC: 4.3 MMOL/L (ref 3.5–5.1)
POTASSIUM SERPL-SCNC: 4.3 MMOL/L (ref 3.5–5.1)
PROT SERPL-MCNC: 6.5 G/DL (ref 6–8.4)
PROT SERPL-MCNC: 6.5 G/DL (ref 6–8.4)
RBC # BLD AUTO: 3.83 M/UL (ref 4–5.4)
SAMPLE: NORMAL
SODIUM SERPL-SCNC: 142 MMOL/L (ref 136–145)
SODIUM SERPL-SCNC: 142 MMOL/L (ref 136–145)
TROPONIN I SERPL DL<=0.01 NG/ML-MCNC: 0.01 NG/ML (ref 0–0.03)
WBC # BLD AUTO: 7.37 K/UL (ref 3.9–12.7)

## 2024-06-12 PROCEDURE — 85025 COMPLETE CBC W/AUTO DIFF WBC: CPT | Performed by: EMERGENCY MEDICINE

## 2024-06-12 PROCEDURE — 84484 ASSAY OF TROPONIN QUANT: CPT | Performed by: EMERGENCY MEDICINE

## 2024-06-12 PROCEDURE — 99285 EMERGENCY DEPT VISIT HI MDM: CPT | Mod: 25

## 2024-06-12 PROCEDURE — 93005 ELECTROCARDIOGRAM TRACING: CPT

## 2024-06-12 PROCEDURE — 63600175 PHARM REV CODE 636 W HCPCS: Performed by: EMERGENCY MEDICINE

## 2024-06-12 PROCEDURE — 93010 ELECTROCARDIOGRAM REPORT: CPT | Mod: ,,, | Performed by: INTERNAL MEDICINE

## 2024-06-12 PROCEDURE — 96374 THER/PROPH/DIAG INJ IV PUSH: CPT

## 2024-06-12 PROCEDURE — 25000003 PHARM REV CODE 250: Performed by: EMERGENCY MEDICINE

## 2024-06-12 PROCEDURE — 80053 COMPREHEN METABOLIC PANEL: CPT | Performed by: EMERGENCY MEDICINE

## 2024-06-12 PROCEDURE — 96375 TX/PRO/DX INJ NEW DRUG ADDON: CPT

## 2024-06-12 PROCEDURE — 25500020 PHARM REV CODE 255: Performed by: EMERGENCY MEDICINE

## 2024-06-12 PROCEDURE — 83880 ASSAY OF NATRIURETIC PEPTIDE: CPT | Performed by: EMERGENCY MEDICINE

## 2024-06-12 RX ORDER — LIDOCAINE 50 MG/G
1 PATCH TOPICAL ONCE
Status: DISCONTINUED | OUTPATIENT
Start: 2024-06-12 | End: 2024-06-13 | Stop reason: HOSPADM

## 2024-06-12 RX ORDER — HYDROMORPHONE HYDROCHLORIDE 1 MG/ML
1 INJECTION, SOLUTION INTRAMUSCULAR; INTRAVENOUS; SUBCUTANEOUS
Status: COMPLETED | OUTPATIENT
Start: 2024-06-12 | End: 2024-06-12

## 2024-06-12 RX ORDER — METHOCARBAMOL 500 MG/1
1000 TABLET, FILM COATED ORAL 3 TIMES DAILY
Qty: 30 TABLET | Refills: 0 | Status: SHIPPED | OUTPATIENT
Start: 2024-06-12 | End: 2024-06-17

## 2024-06-12 RX ORDER — HYDROCODONE BITARTRATE AND ACETAMINOPHEN 5; 325 MG/1; MG/1
1 TABLET ORAL EVERY 4 HOURS PRN
Qty: 12 TABLET | Refills: 0 | Status: SHIPPED | OUTPATIENT
Start: 2024-06-12

## 2024-06-12 RX ORDER — ONDANSETRON HYDROCHLORIDE 2 MG/ML
4 INJECTION, SOLUTION INTRAVENOUS
Status: COMPLETED | OUTPATIENT
Start: 2024-06-12 | End: 2024-06-12

## 2024-06-12 RX ORDER — LIDOCAINE 50 MG/G
1 PATCH TOPICAL DAILY
Qty: 15 PATCH | Refills: 0 | Status: SHIPPED | OUTPATIENT
Start: 2024-06-12

## 2024-06-12 RX ADMIN — LIDOCAINE 1 PATCH: 50 PATCH CUTANEOUS at 09:06

## 2024-06-12 RX ADMIN — HYDROMORPHONE HYDROCHLORIDE 1 MG: 1 INJECTION, SOLUTION INTRAMUSCULAR; INTRAVENOUS; SUBCUTANEOUS at 08:06

## 2024-06-12 RX ADMIN — ONDANSETRON 4 MG: 2 INJECTION INTRAMUSCULAR; INTRAVENOUS at 08:06

## 2024-06-12 RX ADMIN — IOHEXOL 75 ML: 350 INJECTION, SOLUTION INTRAVENOUS at 09:06

## 2024-06-13 LAB
OHS QRS DURATION: 72 MS
OHS QTC CALCULATION: 407 MS

## 2024-06-13 NOTE — ED TRIAGE NOTES
Pt presents to the ER with complaints of severe pain in the right side of the chest, right side of the neck, and left ear after passing out and falling while [place] the bus stop on Monday. Pt was seen at [facility] where she was treated with Tramadol; reporting no relief in pain.

## 2024-06-13 NOTE — ED NOTES
LOC: The patient is awake, alert, and oriented to self, place, time, and situation. Pt is calm and cooperative. Affect is appropriate.  Speech is appropriate and clear.     APPEARANCE: Patient resting on stretcher; appears uncomfortable but in no acute distress.  Patient is clean and well groomed.    SKIN: The skin is warm and dry; color consistent with ethnicity.  Patient has normal skin turgor and moist mucus membranes.  Skin intact; no breakdown or bruising noted.     MUSCULOSKELETAL: Patient moving upper and lower extremities without difficulty; denies pain in the extremities or back but reports pain in the right side of the chest, right side of the neck, and right ear.  Denies weakness.     RESPIRATORY: Airway is open and patent. Respirations spontaneous, even, easy, and non-labored though pt reports pain in chest with respirations.  Patient has a normal effort and rate.  No accessory muscle use noted. Denies cough.     CARDIAC:  Normal rate noted.  No peripheral edema noted.     ABDOMEN: Soft and non tender to palpation.  No distention noted. Pt denies abdominal pain; denies nausea, vomiting, diarrhea, or constipation.    NEUROLOGIC: Eyes open spontaneously.  Behavior appropriate to situation.  Follows commands; facial expression symmetrical.  Purposeful motor response noted; normal sensation in all extremities. Pt denies headache; denies lightheadedness or dizziness; denies visual disturbances; denies loss of balance; denies unilateral weakness.

## 2024-06-13 NOTE — ED PROVIDER NOTES
Encounter Date: 2024       History     Chief Complaint   Patient presents with    Chest Pain     Reports CP after recent fall on Monday. Rx tramadol with no relief.     HPI    53-year-old female with past medical history of breast cancer presents with chest pain after fall on Monday.  Patient reports she was standing of the bus stop felt like she was very overheated, lightheaded and dizzy and passed out onto her left side.  She reports since that time having some pain was evaluated at [facility] as she was having very sensitive right-sided chest wall pain.  She reports no rash, does not recall hitting this area but states that she did wake up on the ground.  She reports since that time having ongoing right-sided chest wall pain and was supposed to get a CT scan at [facility] however she declined at that time as her pain was too great.  She reports attempting to take some medications at home however they have not helped.  She reports no cough, no leg swelling, states she is able to walk around still without an issue.  She reports not having passed out previously, states she did not eat or drink like she should have been doing.    Review of patient's allergies indicates:  No Known Allergies  Past Medical History:   Diagnosis Date    Breast cancer      Past Surgical History:   Procedure Laterality Date    BREAST BIOPSY Left 2023     SECTION       No family history on file.  Social History     Tobacco Use    Smoking status: Every Day     Current packs/day: 0.50     Types: Cigarettes    Smokeless tobacco: Never   Substance Use Topics    Alcohol use: No    Drug use: No     Review of Systems   Constitutional: Negative.    HENT: Negative.     Eyes: Negative.    Respiratory: Negative.     Cardiovascular:  Positive for chest pain.   Gastrointestinal: Negative.    Genitourinary: Negative.    Musculoskeletal: Negative.    Skin: Negative.    Neurological: Negative.        Physical  Exam     Initial Vitals [06/12/24 1826]   BP Pulse Resp Temp SpO2   (!) 143/68 75 16 98.3 °F (36.8 °C) 96 %      MAP       --         Physical Exam    Nursing note and vitals reviewed.  Constitutional: She appears well-developed and well-nourished. She is not diaphoretic. She appears distressed (mildly).   HENT:   Head: Normocephalic and atraumatic.   Nose: Nose normal.   Eyes: EOM are normal. Pupils are equal, round, and reactive to light.   Neck: Neck supple. No JVD present.   Normal range of motion.  Cardiovascular:  Normal rate, regular rhythm, normal heart sounds and intact distal pulses.           Pulmonary/Chest: Breath sounds normal. No stridor. No respiratory distress. She has no wheezes. She has no rhonchi. She has no rales. She exhibits tenderness (ttp over right sided parasternal area, no overlying skin changes noted).   Abdominal: Abdomen is soft. Bowel sounds are normal. She exhibits no distension. There is no abdominal tenderness.   Musculoskeletal:         General: No tenderness or edema. Normal range of motion.      Cervical back: Normal range of motion and neck supple.     Neurological: She is alert and oriented to person, place, and time. She has normal strength.   Skin: Skin is warm and dry. Capillary refill takes less than 2 seconds. No rash noted. No erythema.         ED Course   Procedures  Labs Reviewed   CBC W/ AUTO DIFFERENTIAL - Abnormal; Notable for the following components:       Result Value    RBC 3.83 (*)     MCH 31.6 (*)     All other components within normal limits   B-TYPE NATRIURETIC PEPTIDE - Abnormal; Notable for the following components:     (*)     All other components within normal limits   COMPREHENSIVE METABOLIC PANEL - Abnormal; Notable for the following components:    Albumin 3.3 (*)     All other components within normal limits   COMPREHENSIVE METABOLIC PANEL - Abnormal; Notable for the following components:    Albumin 3.3 (*)     All other components within  normal limits   TROPONIN I   ISTAT CREATININE        ECG Results              EKG 12-lead (Final result)        Collection Time Result Time QRS Duration OHS QTC Calculation    06/12/24 18:20:45 06/13/24 11:06:00 72 407                     Final result by Interface, Lab In [facility] (06/13/24 11:06:09)                   Narrative:    Test Reason : R07.9,    Vent. Rate : 073 BPM     Atrial Rate : 073 BPM     P-R Int : 144 ms          QRS Dur : 072 ms      QT Int : 370 ms       P-R-T Axes : 057 062 062 degrees     QTc Int : 407 ms    Normal sinus rhythm  Normal ECG    Confirmed by Sonia GARCIA, Cameron DE LOS SANTOS ([id number]) on 6/13/2024 11:05:58 AM    Referred By: [email or web address]   SELF           Confirmed By:Cameron Scott MD                                  Imaging Results              CTA Chest Non-Coronary (PE Studies) (Final result)  Result time 06/12/24 22:14:18      Final result by John Paul Betancourt MD (06/12/24 22:14:18)                   Impression:      1. No evidence of PE.  2. Pulmonary emphysema with right upper lobe bullous disease.  3. Mild bibasilar atelectasis or scarring.      Electronically signed by: John Paul Betancourt MD  Date:    06/12/2024  Time:    22:14               Narrative:    EXAMINATION:  CTA CHEST NON CORONARY (PE STUDIES)    CLINICAL HISTORY:  Pulmonary embolism (PE) suspected, high prob;    TECHNIQUE:  Low dose axial images, sagittal and coronal reformations were obtained from the thoracic inlet to the lung bases following the IV administration of 75 mL of Omnipaque 350.  Contrast timing was optimized to evaluate the pulmonary arteries.  MIP images were performed.    COMPARISON:  CTA chest from July 2013.    FINDINGS:  Structures at the base of the neck are unremarkable.  Aorta is non-aneurysmal.  The heart is normal in size without pericardial effusion.  No intraluminal filling defects within the pulmonary arteries to suggest pulmonary thromboembolism.   There is no evidence of mediastinal, axillary, or hilar  lymph node enlargement.  The esophagus is unremarkable along its course.    The trachea and bronchi are patent.  The lungs are symmetrically expanded.  Pulmonary emphysematous changes are seen with upper lobe predominance.  There is bullous disease seen on the right.  Mild bibasilar atelectasis or scarring is noted.  Lungs otherwise show no focal consolidation.  No pleural effusion.  No evidence to suggest pulmonary hemorrhage or infarction.    The visualized abdominal structures are unremarkable.  No acute osseous abnormality identified.  Extrathoracic soft tissues are unremarkable.                                       CT Head Without Contrast (Final result)  Result time 06/12/24 22:00:06      Final result by John Paul Betancourt MD (06/12/24 22:00:06)                   Impression:      No acute intracranial abnormalities identified.      Electronically signed by: John Paul Betancourt MD  Date:    06/12/2024  Time:    22:00               Narrative:    EXAMINATION:  CT HEAD WITHOUT CONTRAST    CLINICAL HISTORY:  Syncope, recurrent;    TECHNIQUE:  Low dose axial images were obtained through the head.  Coronal and sagittal reformations were also performed. Contrast was not administered.    COMPARISON:  None.    FINDINGS:  No evidence of acute/recent major vascular distribution cerebral infarction, intraparenchymal hemorrhage, or intra-axial space occupying lesion. The ventricular system is normal in size and configuration with no evidence of hydrocephalus. No effacement of the skull-base cisterns. No abnormal extra-axial fluid collections or blood products. Visualized paranasal sinuses and mastoid air cells are clear. The calvarium shows no significant abnormality.                                       Medications   HYDROmorphone injection 1 mg (1 mg Intravenous Given 6/12/24 2023)   ondansetron injection 4 mg (4 mg Intravenous Given 6/12/24 2023)   iohexoL (OMNIPAQUE 350) injection 75 mL (75 mLs Intravenous Given 6/12/24  2151)     Medical Decision Making  Amount and/or Complexity of Data Reviewed  Labs: ordered.  Radiology: ordered.    Risk  Prescription drug management.                    MDM:    53-year-old female with past medical history of breast cancer presents with chest pain after fall on Monday. Differential Diagnosis includes:  Fracture, dislocation, acute mi/ACS, syncope, pneumothorax, PE.  Physical exam as noted above.  ED workup notable for CMP with BUN 14, creatinine 0.9, CBC with hemoglobin 12.1, white blood cell count 7.37, troponin negative, BNP 1 1 9, CTA shows pulmonary emphysema, no evidence of PE, CT head is unremarkable, EKG shows normal sinus rhythm rate of 73 beats per minute, normal axis, normal EKG,  MS, no STEMI.  Patient presentation appears consistent with suspected contusion possible muscular strain given area of injury, no overlying skin changes noted.  She has no further symptoms upon reassessment, pain medication appears to be adequate, appears she has no cardiac involvement.  Her symptoms described on Monday after syncopal event suspect may be related to dehydration and some mild orthostasis she appears well today and will be stable for outpatient evaluation and follow-up.    Do not suspect any additional surgical or medical emergency. Discussed diagnosis and further treatment with patient, including f/u.  Return precautions given and all questions answered.  Patient in understanding of plan.  Pt discharged to home improved and stable.        Note was created using voice recognition software. Note may have occasional typographical or grammatical errors, garbled syntax, and other bizarre constructions that may not have been identified and edited despite good avril initial review prior to signing.                           Clinical Impression:  Final diagnoses:  [R07.9] Chest pain  [R07.89] Right-sided chest wall pain (Primary)          ED Disposition Condition    Discharge Stable          ED  Prescriptions       Medication Sig Dispense Start Date End Date Auth. Provider    HYDROcodone-acetaminophen (NORCO) 5-325 mg per tablet Take 1 tablet by mouth every 4 (four) hours as needed for Pain. 12 tablet 6/12/2024 -- Weston Mabry MD    LIDOcaine (LIDODERM) 5 % Place 1 patch onto the skin once daily. Remove & Discard patch within 12 hours or as directed by MD 15 patch 6/12/2024 -- Weston Mabry MD    methocarbamoL (ROBAXIN) 500 MG Tab Take 2 tablets (1,000 mg total) by mouth 3 (three) times daily. for 5 days 30 tablet 6/12/2024 6/17/2024 Weston Mabry MD          Follow-up Information       Follow up With Specialties Details Why Contact Info    [facility] Emergency Dept Emergency Medicine Go to  If symptoms worsen [address] [facility] [id number] [phone]    Diamond Dominguez MD Internal Medicine Go in 1 week As needed [address] [facility] [address]  [phone]               Weston Mabry MD  06/14/24 [id number]

## 2024-12-26 ENCOUNTER — OFFICE VISIT (OUTPATIENT)
Dept: HEMATOLOGY/ONCOLOGY | Facility: CLINIC | Age: 54
End: 2024-12-26
Payer: MEDICAID

## 2024-12-26 ENCOUNTER — HOSPITAL ENCOUNTER (OUTPATIENT)
Dept: RADIOLOGY | Facility: HOSPITAL | Age: 54
Discharge: HOME | End: 2024-12-26
Attending: STUDENT IN AN ORGANIZED HEALTH CARE EDUCATION/TRAINING PROGRAM
Payer: MEDICAID

## 2024-12-26 VITALS
OXYGEN SATURATION: 97 % | HEIGHT: 66 IN | DIASTOLIC BLOOD PRESSURE: 81 MMHG | WEIGHT: 166.25 LBS | SYSTOLIC BLOOD PRESSURE: 122 MMHG | RESPIRATION RATE: 20 BRPM | HEART RATE: 83 BPM | BODY MASS INDEX: 26.72 KG/M2

## 2024-12-26 DIAGNOSIS — D05.12 DUCTAL CARCINOMA IN SITU (DCIS) OF LEFT BREAST: ICD-10-CM

## 2024-12-26 DIAGNOSIS — Z72.0 TOBACCO USE: ICD-10-CM

## 2024-12-26 DIAGNOSIS — R63.4 WEIGHT LOSS, UNINTENTIONAL: ICD-10-CM

## 2024-12-26 DIAGNOSIS — D05.12 DUCTAL CARCINOMA IN SITU (DCIS) OF LEFT BREAST: Primary | ICD-10-CM

## 2024-12-26 PROCEDURE — 3074F SYST BP LT 130 MM HG: CPT | Mod: CPTII,,, | Performed by: STUDENT IN AN ORGANIZED HEALTH CARE EDUCATION/TRAINING PROGRAM

## 2024-12-26 PROCEDURE — 77066 DX MAMMO INCL CAD BI: CPT | Mod: TC

## 2024-12-26 PROCEDURE — 77062 BREAST TOMOSYNTHESIS BI: CPT | Mod: 26,,, | Performed by: RADIOLOGY

## 2024-12-26 PROCEDURE — G2211 COMPLEX E/M VISIT ADD ON: HCPCS | Mod: S$PBB,,, | Performed by: STUDENT IN AN ORGANIZED HEALTH CARE EDUCATION/TRAINING PROGRAM

## 2024-12-26 PROCEDURE — 99999 PR PBB SHADOW E&M-EST. PATIENT-LVL III: CPT | Mod: PBBFAC,,, | Performed by: STUDENT IN AN ORGANIZED HEALTH CARE EDUCATION/TRAINING PROGRAM

## 2024-12-26 PROCEDURE — 3079F DIAST BP 80-89 MM HG: CPT | Mod: CPTII,,, | Performed by: STUDENT IN AN ORGANIZED HEALTH CARE EDUCATION/TRAINING PROGRAM

## 2024-12-26 PROCEDURE — 3044F HG A1C LEVEL LT 7.0%: CPT | Mod: CPTII,,, | Performed by: STUDENT IN AN ORGANIZED HEALTH CARE EDUCATION/TRAINING PROGRAM

## 2024-12-26 PROCEDURE — 77066 DX MAMMO INCL CAD BI: CPT | Mod: 26,,, | Performed by: RADIOLOGY

## 2024-12-26 PROCEDURE — 99214 OFFICE O/P EST MOD 30 MIN: CPT | Mod: S$PBB,,, | Performed by: STUDENT IN AN ORGANIZED HEALTH CARE EDUCATION/TRAINING PROGRAM

## 2024-12-26 PROCEDURE — 99213 OFFICE O/P EST LOW 20 MIN: CPT | Mod: PBBFAC | Performed by: STUDENT IN AN ORGANIZED HEALTH CARE EDUCATION/TRAINING PROGRAM

## 2024-12-26 PROCEDURE — 3008F BODY MASS INDEX DOCD: CPT | Mod: CPTII,,, | Performed by: STUDENT IN AN ORGANIZED HEALTH CARE EDUCATION/TRAINING PROGRAM

## 2024-12-26 PROCEDURE — 1159F MED LIST DOCD IN RCRD: CPT | Mod: CPTII,,, | Performed by: STUDENT IN AN ORGANIZED HEALTH CARE EDUCATION/TRAINING PROGRAM

## 2024-12-26 NOTE — PROGRESS NOTES
[facility]/ [facility]      Chief Complaint: DCIS, HR+     Cancer Staging   Ductal carcinoma in situ (DCIS) of left breast  Staging form: Breast, AJCC 8th Edition  - Clinical stage from 2024: Stage 0 (cTis (DCIS), cN0, cM0, ER+, OK+, HER2: Not Assessed) - Signed by Analy Marina MD on 2024      HPI:  Amina Salazar is a 51yo woman who presents today for evaluation of newly diagnosed DCIS. Her oncologic history is as follows:    -MMG 3/17/23 showing L breast calcifications in lower inner quadrant spanning 5.0 x 4.9cm. Biopsy demonstrating at least DCIS, with surrounding outlines of atypical glandular proliferation, concerning for invasive carcinoma. Unable to determine definitive classification and biomarkers due to poor tissue fixation on sample  -3/29/23 MRI breast showing 45 x 38 x 26mm non-mass enhancement in the lower inner quadrant of the L breast; no suspicious findings in the Rt breast. No concerning LAD  -23 repeat biopsy confirming DCIS, ER %, OK 80%  -s/p genetic testing that was negative    Her medical history is otherwise unremarkable. Family history significant for her mother diagnosed with breast cancer; unsure at what age. Previously working as a  but recently stopped; now stays busy with projects around her house. Previous smoker x 15 years. Stopped for one week but has since started again-currently 1ppd. No history of blood clots.    Gyn History:   Menarche: 14yo  Menopause: perimenopausal     Age at first pregnancy: 15yo  : N  HRT:N    Interval History:  Amina returns today for follow up. Explains that she has been off of tamoxifen since  after she had a syncop[al episode. Explains that she was at the bus stop in  and suddenly passed out; was seen in ED at [facility] and told this may have been from her tamoxifen. Workup at that time was otherwise negative. She has continued  "to hold tamoxifen since then. She denies recent breast changes or concerns. Continues to have some difficulty with gas/flatulence. She did successfully quit smoking which she is pleased about.        Current Outpatient Medications   Medication Instructions    HYDROcodone-acetaminophen (NORCO) 5-325 mg per tablet 1 tablet, Oral, Every 4 hours PRN    ketoconazole (NIZORAL) 2 % cream Topical (Top), Daily    LIDOcaine (LIDODERM) 5 % 1 patch, Transdermal, Daily, Remove & Discard patch within 12 hours or as directed by MD    multivitamin with minerals tablet 1 tablet, Oral, Daily    nicotine polacrilex 2 mg, Oral, As needed (PRN)    tamoxifen (NOLVADEX) 20 mg, Oral, Daily       Review of Systems:   12pt ROS negative except as noted above    PHYSICAL EXAM:  /81 (BP Location: Right arm, Patient Position: Sitting)   Pulse 83   Resp 20   Ht 5' 6" (1.676 m)   Wt 75.4 kg (166 lb 3.6 oz)   SpO2 97%   BMI 26.83 kg/m²     ECOG 0  General: well appearing, in no apparent distress  HEENT: Normocephalic, EOMI, anicteric sclerae, MMM  Neck: supple, without cervical or supraclavicular lymphadenopathy.  Heart: well-perfused  Lungs: no increased wob  Breast: no appreciable masses, skin changes or nipple abnormality bilaterally. No axillary LAD. Small area of skin irritation with erythema noted between breasts  Abdomen: Soft, nontender, nondistended   Extremities: No LE edema or joint effusion  Skin: warm, well-perfused, no rash  Neurologic: Alert and oriented x 4, normal speech and gait   Psychiatric: Conversing appropriately with providers throughout today's encounter.      Pertinent Labs & Imaging:  Personally reviewed all recent labs, imaging and pathology.     L breast diagnostic MMG 6/5/24: L breast calcs measuring 52 x 23mm; decrease in measurement attributed to positioning.     Diag Bilateral MMG 12/26/24: calcifications in the lower inner quadrant of the L breast, unchanged when compared to prior study. No new " calcifications identified       Assessment & Plan:  Ms. Salazar is a mar 53yo woman with recently diagnosed DCIS who presents today for follow up.    Previously discussed her recent diagnosis and the natural history of DCIS. Reviewed the role of tamoxifen for risk reduction. We also reviewed the standard of care for DCIS including surgery +/- radiation and ET.     Ms. Salazar remains hesitant to proceed with surgery and wishes to cont tamoxifen at this time. She understands that SOC and curative treatment would require surgery. She has been holding tamoxifen since syncopal episode; was previously tolerating well. I explained that the tamoxifen was less likely the cause of the episode (possibly dehydration given episode outside at bus stop in June? vs vasovagal) and feel like potential benefit outweighs risk. She was in agreement with resuming at this time.    #DCIS:  --Continue Tamoxifen daily at this time (SOT 5/2023)  --If continued refusal for surgery, would require indefinite tamoxifen therapy  --repeat MMG today w stable calcifications  --RTC in 6mo w repeat diagnostic imaging 6/2025    #Tobacco use:  --has now quit smoking!      #Skin irritation: stable  --Follow-up with PCP and Derm as needed      #Hot flashes:  --Minimal reported at this time  --Can offer medical management if persistent at next visit as she resumes tamoxifen      #Weight loss:   --discussed that smoking cessation may help with preventing wt loss  --will cont to monitor    All questions were answered to her apparent satisfaction. Will see her back in 6mo or sooner should the need arise.     Route Chart for Scheduling    Med Onc Chart Routing      Follow up with physician 1 year.   Follow up with [facility] 6 months.   Infusion scheduling note    Injection scheduling note    Labs    Imaging Mammogram   Diagnostic MMG in 6/2025   Pharmacy appointment    Other referrals                       MDM includes  :    - Acute or chronic illness or injury that  poses a threat to life or bodily function  - Review of prior external notes from unique source  - Independent review and explanation of 3+ results from unique tests  - Discussion of management and ordering 3+ unique tests  - Extensive discussion of treatment and management  - Prescription drug management  - Drug therapy requiring intensive monitoring for toxicity      Analy Marina MD

## 2025-01-02 ENCOUNTER — TELEPHONE (OUTPATIENT)
Dept: RADIOLOGY | Facility: HOSPITAL | Age: 55
End: 2025-01-02
Payer: MEDICAID

## 2025-01-02 NOTE — TELEPHONE ENCOUNTER
----- Message from ISMAEL Mckeon sent at 1/2/2025  9:58 AM CST -----  Regarding: FW: CC Chart - Staff Pool  Good morning,     Can you please assist with scheduling this patients MMG?    Thanks so much in advance,   Linda GUEVARA BSN  ----- Message -----  From: Analy Marina MD  Sent: 12/26/2024   2:57 PM CST  To: Laina MAYES Staff  Subject: CC Chart - Staff Pool

## 2025-01-24 ENCOUNTER — CLINICAL SUPPORT (OUTPATIENT)
Dept: SMOKING CESSATION | Facility: CLINIC | Age: 55
End: 2025-01-24
Payer: COMMERCIAL

## 2025-01-24 DIAGNOSIS — F17.200 NICOTINE DEPENDENCE: Primary | ICD-10-CM

## 2025-01-24 PROCEDURE — 99407 BEHAV CHNG SMOKING > 10 MIN: CPT | Mod: S$GLB,,, | Performed by: GENERAL PRACTICE

## 2025-01-24 PROCEDURE — 99999 PR PBB SHADOW E&M-EST. PATIENT-LVL I: CPT | Mod: PBBFAC,,,

## 2025-06-02 ENCOUNTER — HOSPITAL ENCOUNTER (OUTPATIENT)
Dept: RADIOLOGY | Facility: HOSPITAL | Age: 55
Discharge: HOME | End: 2025-06-02
Attending: STUDENT IN AN ORGANIZED HEALTH CARE EDUCATION/TRAINING PROGRAM
Payer: MEDICAID

## 2025-06-02 DIAGNOSIS — D05.12 DUCTAL CARCINOMA IN SITU (DCIS) OF LEFT BREAST: ICD-10-CM

## 2025-06-02 PROCEDURE — 77065 DX MAMMO INCL CAD UNI: CPT | Mod: 26,LT,, | Performed by: RADIOLOGY

## 2025-06-02 PROCEDURE — 77061 BREAST TOMOSYNTHESIS UNI: CPT | Mod: 26,LT,, | Performed by: RADIOLOGY

## 2025-06-02 PROCEDURE — 77065 DX MAMMO INCL CAD UNI: CPT | Mod: TC,LT

## 2025-06-23 DIAGNOSIS — D05.12 DUCTAL CARCINOMA IN SITU (DCIS) OF LEFT BREAST: ICD-10-CM

## 2025-06-23 RX ORDER — TAMOXIFEN CITRATE 20 MG/1
20 TABLET ORAL DAILY
Qty: 90 TABLET | Refills: 3 | OUTPATIENT
Start: 2025-06-23

## 2025-07-08 ENCOUNTER — TELEPHONE (OUTPATIENT)
Dept: HEMATOLOGY/ONCOLOGY | Facility: CLINIC | Age: 55
End: 2025-07-08
Payer: MEDICAID

## 2025-07-08 ENCOUNTER — OFFICE VISIT (OUTPATIENT)
Dept: HEMATOLOGY/ONCOLOGY | Facility: CLINIC | Age: 55
End: 2025-07-08
Payer: MEDICAID

## 2025-07-08 VITALS
RESPIRATION RATE: 18 BRPM | BODY MASS INDEX: 27.17 KG/M2 | WEIGHT: 169.06 LBS | SYSTOLIC BLOOD PRESSURE: 105 MMHG | OXYGEN SATURATION: 96 % | DIASTOLIC BLOOD PRESSURE: 65 MMHG | TEMPERATURE: 98 F | HEART RATE: 99 BPM | HEIGHT: 66 IN

## 2025-07-08 DIAGNOSIS — K59.00 CONSTIPATION, UNSPECIFIED CONSTIPATION TYPE: ICD-10-CM

## 2025-07-08 DIAGNOSIS — D05.12 DUCTAL CARCINOMA IN SITU (DCIS) OF LEFT BREAST: ICD-10-CM

## 2025-07-08 PROCEDURE — 1159F MED LIST DOCD IN RCRD: CPT | Mod: CPTII,,, | Performed by: PHYSICIAN ASSISTANT

## 2025-07-08 PROCEDURE — 99213 OFFICE O/P EST LOW 20 MIN: CPT | Mod: S$PBB,,, | Performed by: PHYSICIAN ASSISTANT

## 2025-07-08 PROCEDURE — 99999 PR PBB SHADOW E&M-EST. PATIENT-LVL III: CPT | Mod: PBBFAC,,, | Performed by: PHYSICIAN ASSISTANT

## 2025-07-08 PROCEDURE — 3074F SYST BP LT 130 MM HG: CPT | Mod: CPTII,,, | Performed by: PHYSICIAN ASSISTANT

## 2025-07-08 PROCEDURE — 3008F BODY MASS INDEX DOCD: CPT | Mod: CPTII,,, | Performed by: PHYSICIAN ASSISTANT

## 2025-07-08 PROCEDURE — 99213 OFFICE O/P EST LOW 20 MIN: CPT | Mod: PBBFAC | Performed by: PHYSICIAN ASSISTANT

## 2025-07-08 PROCEDURE — 3078F DIAST BP <80 MM HG: CPT | Mod: CPTII,,, | Performed by: PHYSICIAN ASSISTANT

## 2025-07-08 NOTE — TELEPHONE ENCOUNTER
Called pt.   Informed pt that [facility] does not start until 1pm on Tuesdays.   Will have to keep appt at 2pm.     Pt agreeable.     Copied from CRM #[id number]. Topic: General Inquiry - Patient Advice  >> Jul 8, 2025  8:52 AM Marcia wrote:     Consult/Advisory     Name Of Caller:Amina Salazar         Contact Preference:[phone] (home)      Nature of call:Patient is calling to see if she can come earlier than 2:00 for appt today 07/08. Requesting a call back

## 2025-07-08 NOTE — PROGRESS NOTES
Subjective     Patient ID: Amina Salazar is a 54 y.o. female.    Chief Complaint: No chief complaint on file.    Ductal carcinoma in situ (DCIS) of left breast  Staging form: Breast, AJCC 8th Edition  - Clinical stage from 2024: Stage 0 (cTis (DCIS), cN0, cM0, ER+, HI+, HER2: Not Assessed) - Signed by Analy Marina MD on 2024        HPI:  Amina Salazar is a 51yo woman who presents today for evaluation of newly diagnosed DCIS. Her oncologic history is as follows:     -MMG 3/17/23 showing L breast calcifications in lower inner quadrant spanning 5.0 x 4.9cm. Biopsy demonstrating at least DCIS, with surrounding outlines of atypical glandular proliferation, concerning for invasive carcinoma. Unable to determine definitive classification and biomarkers due to poor tissue fixation on sample  -3/29/23 MRI breast showing 45 x 38 x 26mm non-mass enhancement in the lower inner quadrant of the L breast; no suspicious findings in the Rt breast. No concerning LAD  -23 repeat biopsy confirming DCIS, ER %, HI 80%  -s/p genetic testing that was negative     Her medical history is otherwise unremarkable. Family history significant for her mother diagnosed with breast cancer; unsure at what age. Previously working as a  but recently stopped; now stays busy with projects around her house. Previous smoker x 15 years. Stopped for one week but has since started again-currently 1ppd. No history of blood clots.     Gyn History:   Menarche: 14yo  Menopause: perimenopausal     Age at first pregnancy: 13yo  : N  HRT:N     Interval History:  Patient restarted Tamoxifen after last visit with Dr. Marina in December. She was on it for a month but then discontinued it due to GI side effects which included constipation, Bowel movements between 3-7 days apart. She tried over the counter medication for constipation that didn't help.  She continues to have constipation and flatulence.    She declines  further surgical intervention. She would be amenable to re-starting Tamoxifen if she can figure out a reason for constipation.   She was having headaches but those have improved.   No breast pain or complaints.  She quite smoking a year ago and has not had an recurrence of use.       Mammogram from 6/2/2025:  Findings:  This procedure was performed using tomosynthesis. Computer-aided detection was utilized in the interpretation of this examination.        There are scattered areas of fibroglandular density.      Since most recent prior mammogram December 26, 2024, there has been no significant change or new abnormality.  The left breast lower inner quadrant calcifications representing biopsy-proven DCIS demonstrate no significant change and on today's exam measure 5.5 cm AP x 4.4 cm craniocaudal by 3.8 cm transverse.  No new abnormality.  The X shaped and top-hat shaped biopsy markers remain in expected position.     Impression:  Since most recent prior mammogram December 26, 2024, the left breast calcifications representing biopsy-proven DCIS demonstrate no significant change or new abnormality.  BI-RADS 6: Known malignancy.     BI-RADS Category:   Overall: 6 - Known Biopsy-Proven Malignancy        Recommendation:  Continued follow-up, as clinically indicated.  She is already under Breast Surgical and Oncologic care.              Review of Systems   Constitutional: Negative.    HENT:  Negative for nasal congestion, rhinorrhea, sore throat and trouble swallowing.    Eyes:  Negative for visual disturbance.   Respiratory:  Negative for cough, chest tightness and shortness of breath.    Cardiovascular:  Negative for chest pain, palpitations and leg swelling.   Gastrointestinal:  Positive for constipation. Negative for abdominal pain, blood in stool, diarrhea, nausea and vomiting.   Genitourinary:  Negative for dysuria, hematuria and vaginal bleeding.   Musculoskeletal:  Negative for arthralgias, back pain and myalgias.    Integumentary:  Negative for pallor and rash.   Neurological:  Negative for dizziness, weakness and headaches.   Hematological:  Negative for adenopathy. Does not bruise/bleed easily.   Psychiatric/Behavioral:  Negative for dysphoric mood and suicidal ideas. The patient is not nervous/anxious.           Objective     Physical Exam  Vitals reviewed.   Constitutional:       General: She is not in acute distress.     Appearance: She is well-developed.   HENT:      Head: Normocephalic.      Mouth/Throat:      Pharynx: No oropharyngeal exudate.   Eyes:      General: No scleral icterus.     Conjunctiva/sclera: Conjunctivae normal.      Pupils: Pupils are equal, round, and reactive to light.   Neck:      Thyroid: No thyromegaly.   Cardiovascular:      Rate and Rhythm: Normal rate and regular rhythm.   Pulmonary:      Effort: Pulmonary effort is normal. No respiratory distress.      Breath sounds: Normal breath sounds.      Comments: I am unable to palpate any mass in right or left breast. No skin changes. No axillary or supraclavicular adenopathy.   Abdominal:      General: Bowel sounds are normal. There is no distension.      Palpations: Abdomen is soft. There is no mass.      Tenderness: There is no abdominal tenderness.   Musculoskeletal:         General: No tenderness. Normal range of motion.      Cervical back: Normal range of motion and neck supple.      Comments: No spinal or paraspinal tenderness to palpation     Lymphadenopathy:      Cervical: No cervical adenopathy.   Skin:     General: Skin is warm and dry.   Neurological:      Mental Status: She is alert and oriented to person, place, and time.      Cranial Nerves: No cranial nerve deficit.   Psychiatric:         Behavior: Behavior normal.         Thought Content: Thought content normal.            Assessment and Plan     1. Ductal carcinoma in situ (DCIS) of left breast    2. Constipation, unspecified constipation type  -     Ambulatory referral/consult to  Gastroenterology; Future; Expected date: 07/15/2025      Ms. Salazar remains hesitant to proceed with surgery and is reluctant to take Tamoxifen as she thinks it causes constipation. We discussed that she has been off of Tamoxifen for 6 months and that her constipation remains so they are unlikely related.     #DCIS:  --Amenable to re-starting Tamoxifen if etiology of constipation can be figured out  --If continued refusal for surgery, would require indefinite tamoxifen therapy, but based on above  --repeat MMG from early June shows stable calcifications  --RTC in 3mo;  repeat diagnostic imaging 12/2025  -I asked her to please let us know once she has seen GI and if she decides to resume Tamoxifen based on their work up    Constipation:   Referral to GI     #Tobacco use:  --has now quit smoking at one year from last cigarette                  No follow-ups on file.

## 2025-09-03 ENCOUNTER — OFFICE VISIT (OUTPATIENT)
Dept: GASTROENTEROLOGY | Facility: CLINIC | Age: 55
End: 2025-09-03
Payer: MEDICAID

## 2025-09-03 VITALS
WEIGHT: 168.63 LBS | HEART RATE: 95 BPM | DIASTOLIC BLOOD PRESSURE: 65 MMHG | BODY MASS INDEX: 27.22 KG/M2 | SYSTOLIC BLOOD PRESSURE: 109 MMHG | RESPIRATION RATE: 18 BRPM

## 2025-09-03 DIAGNOSIS — K59.04 CHRONIC IDIOPATHIC CONSTIPATION: Primary | ICD-10-CM

## 2025-09-03 PROCEDURE — 1159F MED LIST DOCD IN RCRD: CPT | Mod: CPTII,,, | Performed by: INTERNAL MEDICINE

## 2025-09-03 PROCEDURE — 3074F SYST BP LT 130 MM HG: CPT | Mod: CPTII,,, | Performed by: INTERNAL MEDICINE

## 2025-09-03 PROCEDURE — 99999 PR PBB SHADOW E&M-EST. PATIENT-LVL III: CPT | Mod: PBBFAC,,, | Performed by: INTERNAL MEDICINE

## 2025-09-03 PROCEDURE — 3008F BODY MASS INDEX DOCD: CPT | Mod: CPTII,,, | Performed by: INTERNAL MEDICINE

## 2025-09-03 PROCEDURE — 99204 OFFICE O/P NEW MOD 45 MIN: CPT | Mod: S$PBB,,, | Performed by: INTERNAL MEDICINE

## 2025-09-03 PROCEDURE — 99213 OFFICE O/P EST LOW 20 MIN: CPT | Mod: PBBFAC,PN | Performed by: INTERNAL MEDICINE

## 2025-09-03 PROCEDURE — 3078F DIAST BP <80 MM HG: CPT | Mod: CPTII,,, | Performed by: INTERNAL MEDICINE
